# Patient Record
Sex: MALE | NOT HISPANIC OR LATINO | Employment: PART TIME | URBAN - METROPOLITAN AREA
[De-identification: names, ages, dates, MRNs, and addresses within clinical notes are randomized per-mention and may not be internally consistent; named-entity substitution may affect disease eponyms.]

---

## 2018-11-10 ENCOUNTER — APPOINTMENT (OUTPATIENT)
Dept: RADIOLOGY | Facility: MEDICAL CENTER | Age: 43
End: 2018-11-10
Attending: EMERGENCY MEDICINE
Payer: COMMERCIAL

## 2018-11-10 ENCOUNTER — HOSPITAL ENCOUNTER (EMERGENCY)
Facility: MEDICAL CENTER | Age: 43
End: 2018-11-10
Attending: EMERGENCY MEDICINE
Payer: COMMERCIAL

## 2018-11-10 VITALS
HEIGHT: 72 IN | DIASTOLIC BLOOD PRESSURE: 83 MMHG | HEART RATE: 89 BPM | BODY MASS INDEX: 31.35 KG/M2 | WEIGHT: 231.48 LBS | SYSTOLIC BLOOD PRESSURE: 123 MMHG | RESPIRATION RATE: 14 BRPM | OXYGEN SATURATION: 97 % | TEMPERATURE: 97.7 F

## 2018-11-10 DIAGNOSIS — M89.9 LYTIC LESION OF BONE ON X-RAY: ICD-10-CM

## 2018-11-10 LAB
ANION GAP SERPL CALC-SCNC: 10 MMOL/L (ref 0–11.9)
BASOPHILS # BLD AUTO: 0.4 % (ref 0–1.8)
BASOPHILS # BLD: 0.03 K/UL (ref 0–0.12)
BUN SERPL-MCNC: 17 MG/DL (ref 8–22)
CALCIUM SERPL-MCNC: 9.9 MG/DL (ref 8.5–10.5)
CHLORIDE SERPL-SCNC: 105 MMOL/L (ref 96–112)
CO2 SERPL-SCNC: 24 MMOL/L (ref 20–33)
CREAT SERPL-MCNC: 0.77 MG/DL (ref 0.5–1.4)
CRP SERPL HS-MCNC: 0.82 MG/DL (ref 0–0.75)
EOSINOPHIL # BLD AUTO: 0.11 K/UL (ref 0–0.51)
EOSINOPHIL NFR BLD: 1.4 % (ref 0–6.9)
ERYTHROCYTE [DISTWIDTH] IN BLOOD BY AUTOMATED COUNT: 36.1 FL (ref 35.9–50)
ERYTHROCYTE [SEDIMENTATION RATE] IN BLOOD BY WESTERGREN METHOD: 14 MM/HOUR (ref 0–15)
GLUCOSE SERPL-MCNC: 123 MG/DL (ref 65–99)
HCT VFR BLD AUTO: 47.9 % (ref 42–52)
HGB BLD-MCNC: 15.1 G/DL (ref 14–18)
IMM GRANULOCYTES # BLD AUTO: 0.03 K/UL (ref 0–0.11)
IMM GRANULOCYTES NFR BLD AUTO: 0.4 % (ref 0–0.9)
LYMPHOCYTES # BLD AUTO: 1.81 K/UL (ref 1–4.8)
LYMPHOCYTES NFR BLD: 22.9 % (ref 22–41)
MCH RBC QN AUTO: 21.4 PG (ref 27–33)
MCHC RBC AUTO-ENTMCNC: 31.5 G/DL (ref 33.7–35.3)
MCV RBC AUTO: 67.8 FL (ref 81.4–97.8)
MONOCYTES # BLD AUTO: 0.47 K/UL (ref 0–0.85)
MONOCYTES NFR BLD AUTO: 5.9 % (ref 0–13.4)
NEUTROPHILS # BLD AUTO: 5.47 K/UL (ref 1.82–7.42)
NEUTROPHILS NFR BLD: 69 % (ref 44–72)
NRBC # BLD AUTO: 0 K/UL
NRBC BLD-RTO: 0 /100 WBC
PLATELET # BLD AUTO: 291 K/UL (ref 164–446)
PMV BLD AUTO: 10 FL (ref 9–12.9)
POTASSIUM SERPL-SCNC: 4.1 MMOL/L (ref 3.6–5.5)
RBC # BLD AUTO: 7.06 M/UL (ref 4.7–6.1)
SODIUM SERPL-SCNC: 139 MMOL/L (ref 135–145)
WBC # BLD AUTO: 7.9 K/UL (ref 4.8–10.8)

## 2018-11-10 PROCEDURE — 71046 X-RAY EXAM CHEST 2 VIEWS: CPT

## 2018-11-10 PROCEDURE — 80048 BASIC METABOLIC PNL TOTAL CA: CPT

## 2018-11-10 PROCEDURE — 73030 X-RAY EXAM OF SHOULDER: CPT | Mod: LT

## 2018-11-10 PROCEDURE — 86140 C-REACTIVE PROTEIN: CPT

## 2018-11-10 PROCEDURE — 96372 THER/PROPH/DIAG INJ SC/IM: CPT

## 2018-11-10 PROCEDURE — 99284 EMERGENCY DEPT VISIT MOD MDM: CPT

## 2018-11-10 PROCEDURE — 700111 HCHG RX REV CODE 636 W/ 250 OVERRIDE (IP): Performed by: EMERGENCY MEDICINE

## 2018-11-10 PROCEDURE — 73060 X-RAY EXAM OF HUMERUS: CPT | Mod: LT

## 2018-11-10 PROCEDURE — 85652 RBC SED RATE AUTOMATED: CPT

## 2018-11-10 PROCEDURE — 85025 COMPLETE CBC W/AUTO DIFF WBC: CPT

## 2018-11-10 RX ORDER — OXYCODONE HYDROCHLORIDE AND ACETAMINOPHEN 5; 325 MG/1; MG/1
1-2 TABLET ORAL EVERY 4 HOURS PRN
Qty: 18 TAB | Refills: 0 | Status: SHIPPED | OUTPATIENT
Start: 2018-11-10 | End: 2018-11-13

## 2018-11-10 RX ORDER — KETOROLAC TROMETHAMINE 30 MG/ML
30 INJECTION, SOLUTION INTRAMUSCULAR; INTRAVENOUS ONCE
Status: COMPLETED | OUTPATIENT
Start: 2018-11-10 | End: 2018-11-10

## 2018-11-10 RX ADMIN — KETOROLAC TROMETHAMINE 30 MG: 30 INJECTION, SOLUTION INTRAMUSCULAR at 10:39

## 2018-11-10 ASSESSMENT — PAIN SCALES - GENERAL
PAINLEVEL_OUTOF10: 8
PAINLEVEL_OUTOF10: 8

## 2018-11-10 NOTE — ED PROVIDER NOTES
ED Provider Note  CHIEF COMPLAINT  Chief Complaint   Patient presents with   • Arm Pain     L arm, denies trauma   • Arm Swelling     Seen at 10:10 AM    HPI  Abiodun Alvarez is a 43 y.o. male who presents with gradually worsening left shoulder pain and decreased range of motion for the past 2 weeks.  The patient is right-hand dominant.  He denies any trauma.  He is employed as a cook.  He denies any neck pain, fevers, chills, numbness, weakness, night sweats, weight loss, impaired immunity.    REVIEW OF SYSTEMS  See HPI, remainder review of systems negative.  PAST MEDICAL HISTORY   has a past medical history of Diabetes.    SOCIAL HISTORY  Social History     Social History Main Topics   • Smoking status: Never Smoker   • Smokeless tobacco: Not on file   • Alcohol use No   • Drug use: No   • Sexual activity: Not on file       SURGICAL HISTORY  patient denies any surgical history    CURRENT MEDICATIONS  Reviewed.  See Encounter Summary.     ALLERGIES  No Known Allergies    PHYSICAL EXAM  VITAL SIGNS: /83   Pulse 89   Temp 36.5 °C (97.7 °F)   Resp 14   Ht 1.829 m (6')   Wt 105 kg (231 lb 7.7 oz)   SpO2 97%   BMI 31.39 kg/m²   Constitutional: Awake, alert in no apparent distress.  HENT: Normocephalic, Bilateral external ears normal. Nose normal.  Neck is supple, full range of motion.  Eyes: Conjunctiva normal, non-icteric, EOMI.    Thorax & Lungs: Easy unlabored respirations, CTA B  Cardiovascular: Regular rate and rhythm  Abdomen:  No distention  Skin: Visualized skin is  Dry, No erythema, No rash.   Extremities:   Left upper extremity: Excellent  strength, no pain or limitation to the hand, wrist or left elbow.  The patient has no tenderness or swelling over the left shoulder.  There is no tenderness along the clavicle, acromion or scapula.  There is no tenderness over the humeral head.  The patient has significantly decreased range of motion and is only able to range approximately 15-20 degrees in  any direction with regards to left shoulder.  He has pain with all directional movement.    Neurologic: Alert, Grossly non-focal.   Psychiatric: Affect and Mood normal  No cervical, supraclavicular or axillary lymphadenopathy.  RADIOLOGY  DX-HUMERUS 2+ LEFT   Final Result      Left proximal humeral bony lytic lesion with permeative destructive pattern. This again likely represents either metastatic disease, myeloma, or lymphoma.      DX-CHEST-2 VIEWS   Final Result      Linear atelectasis within the left lung base.      DX-SHOULDER 2+ LEFT   Final Result      Ill-defined lytic permeative destructive process seen involving the proximal left proximal humeral diaphysis. Differential diagnosis includes metastatic disease, lymphoma and myeloma.          Nursing notes and vital signs were reviewed. (See chart for details)    12:55 PM -I discussed the treatment results with the patient via translation line.  I also discussed the x-ray with Dr. Blanc of orthopedic surgery.  Dr. Blanc recommends immobilizing the shoulder as it as a high risk of pathologic fracture.      In prescribing controlled substances to this patient, I certify that I have obtained and reviewed the medical history of Abiodun Alvarez. I have also made a good get effort to obtain applicable records from other providers who have treated the patient and records did not demonstrate any increased risk of substance abuse that would prevent me from prescribing controlled substances.     I have conducted a physical exam and documented it. I have reviewed Mr. Alvarez’s prescription history as maintained by the [place] Prescription Monitoring Program.     I have assessed the patient’s risk for abuse, dependency, and addiction using the validated Opioid Risk Tool available at https://www.mdcalc.com/vmdkwd-lffg-ayyf-ort-narcotic-abuse.     Given the above, I believe the benefits of controlled substance therapy outweigh the risks. The reasons for prescribing  controlled substances include patient has metastatic bony destruction which in general is extremely painful.  Accordingly, I have discussed the risk and benefits, treatment plan, and alternative therapies with the patient.         Decision Making:  This is a 43 y.o. year old male who presents with atraumatic pain of the left shoulder.  Differential included septic shoulder, frozen shoulder for possible rotator cuff injury.  Surprisingly, plain films revealed bony destruction concerning for malignancy.  The patient is not hypercalcemic.  He is not anemic, he does not have any leukocytosis.  Clinically he appears well.  He denies any B symptoms.  There is no obvious sign of a malignancy in the lab evaluation.  Chest x-ray is negative for mass.    There is no sign of a septic shoulder, informatory markers are normal, he is afebrile, he has no leukocytosis.    At this point, the pain appears to be related to a underlying malignancy.  Because he is well-appearing this can be worked up on an outpatient basis.  He has a primary care physician follow-up on Monday.  I recommend he take this documentation to the clinic as he will need significant outpatient laboratory workup and possible PET scan.    The patient's blood pressure is elevated today. >120/80. I have referred them to primary care for follow up.       DISPOSITION:  Patient will be discharged home in good condition.    Discharge Medications:  Discharge Medication List as of 11/10/2018  1:43 PM      START taking these medications    Details   oxyCODONE-acetaminophen (PERCOCET) 5-325 MG Tab Take 1-2 Tabs by mouth every four hours as needed for up to 3 days., Disp-18 Tab, R-0, Print Rx Paper, For 3 days             The patient was discharged home (see d/c instructions) and told to return immediately for any signs or symptoms listed, or any worsening at all.  The patient verbally agreed to the discharge precautions and follow-up plan which is documented in  EPIC.          FINAL IMPRESSION  1. Lytic lesion of bone on x-ray

## 2018-11-10 NOTE — ED TRIAGE NOTES
Chief Complaint   Patient presents with   • Arm Pain     L arm, denies trauma   • Arm Swelling      services used. Pt reporting L arm musculoskeletal pain. Denies CP/ SOB. Limited ROM at shoulder.    /83   Pulse 89   Temp 36.5 °C (97.7 °F)   Resp 14   Ht 1.829 m (6')   Wt 105 kg (231 lb 7.7 oz)   SpO2 97%   BMI 31.39 kg/m²     Pt Informed regarding triage process and verbalized understanding to inform triage tech or RN for any changes in condition.  Placed in lobby.

## 2018-11-10 NOTE — ED NOTES
"Chief Complaint   Patient presents with   • Arm Pain     L arm, denies trauma   • Arm Swelling       Pt ambulatory from triage with steady gait. Agree with triage RN note.   Pt guarding L arm.  LUE pain x2 weeks, warm to touch.    Pt reports surgery on L wrist \"many years ago\"  CMS intact.  ERP at bedside, using ipad for .    "

## 2018-11-10 NOTE — ED NOTES
Pt given discharge instructions.   services used for instructions and education.  Pt given sling for arm and instructed that the LUE is non-weight bearing.   Medication education provided. Pt aware not to drive after taking narcotics.  Signed copy in chart along with controlled substance informed consent translated and signed/in chart. Pt aware to follow-up with Ortho asap.  All patient questions answered, reinforced to call with any other questions.  Pt states all belongings in possession. Pt ambulatory off unit.

## 2018-11-14 DIAGNOSIS — M89.9 LYTIC LESION OF BONE ON X-RAY: ICD-10-CM

## 2018-11-15 DIAGNOSIS — M89.9 LYTIC BONE LESIONS ON XRAY: ICD-10-CM

## 2018-11-16 ENCOUNTER — TELEPHONE (OUTPATIENT)
Dept: HEMATOLOGY ONCOLOGY | Facility: MEDICAL CENTER | Age: 43
End: 2018-11-16

## 2018-11-16 NOTE — TELEPHONE ENCOUNTER
Patient has decide to do the PET CT Scan at [facility]. Please call and let [facility] know to fax our office the results [phone]

## 2018-11-16 NOTE — TELEPHONE ENCOUNTER
"Called patient to explain the diet preparation for their scheduled PET/CT. Patient is scheduled on 11/26/2018 with a check in time of 2:15 pm at [address]  The diet preparation starts on 11/25/2018 at 2:30 pm.      Patient is to avoid any sugars or carbohydrates. Also to avoid items like bread, pastry, cereal, potatoes, pasta, rice, soy products, and vegetables, fruits, desserts,cakes, yogurt or cheese. The only beverage the patient may drink is \"Strictly Water\" (No favored water) No milk,soft drinks, tonic or juices, beer, alcoholic beverages or any canned protein shakes. Avoid any processed meats like deli meats, sausage links or cruz. Patient may only have pure protein meats (Chicken,fish,beef or pork) and eggs. Instructed patient not to duque meat in oil and to lightly season the meat. Also avoid any dipping sauces (ranch, ketchup, hot sauce etc.)    The day of the exam the patient is to have nothing to eat or drink (except water) for at least 6 hours before the scan. This includes not eating candy, gum, mints, cough drops, and/or nicorette gum.    Patient must drink 16 oz of water two hours before the scan and then another 16 oz or water one hour before the exam. Patient is aware on the morning of the PET/CT scan they are to drink 16 oz of water at 12:30 pm and another 16 oz of water at 1:30 pm.    If they are diabetic contact [facility]'s PET/CT department at [phone].    Patient may take prescribed mediations with water, If any of their medications must be taken with food, ask their doctor whether they should skip their morning dose, take it later in the day, or take it without food on the day of the scan.     Patient stated \"I actually thought that my husbands appointment is going to be on 11/20/2018 in the morning at 7:10 am. A girl from imaging called and was able to schedule for this time I gave you\" I let patients wife know unfortunately I didn't see any records of that information or conversation. " Patient stated she will call back to our office with updated information of the gal she spoke with earlier today.

## 2018-11-20 ENCOUNTER — TELEPHONE (OUTPATIENT)
Dept: HEMATOLOGY ONCOLOGY | Facility: MEDICAL CENTER | Age: 43
End: 2018-11-20

## 2018-11-20 NOTE — TELEPHONE ENCOUNTER
Called [facility] to obtain PET/CT Results and I spoke with Shahla, she stated patient is actually about to have the PET/CT Scan at the moment and advised for me to call later on this afternoon for results to be faxed over. I agreed and will call later this afternoon.

## 2018-11-21 NOTE — TELEPHONE ENCOUNTER
Patients wife returned Tasha Matute Medical Assistant regarding rescheduled new oncology appointment. Patient was rescheduled for 11/28/2018 at 3:40 pm. Patient's wife verbalized understanding and will call our office if there are any further questions or concerns.

## 2018-11-21 NOTE — TELEPHONE ENCOUNTER
Called patient to inform him that per  he would like to see him sooner for a new patient appointment. Patient has been rescheduled for 11/28/18 1560, left voicemail.

## 2018-11-26 ENCOUNTER — TELEPHONE (OUTPATIENT)
Dept: HEMATOLOGY ONCOLOGY | Facility: MEDICAL CENTER | Age: 43
End: 2018-11-26

## 2018-11-28 ENCOUNTER — OFFICE VISIT (OUTPATIENT)
Dept: HEMATOLOGY ONCOLOGY | Facility: MEDICAL CENTER | Age: 43
End: 2018-11-28
Payer: COMMERCIAL

## 2018-11-28 VITALS
RESPIRATION RATE: 16 BRPM | HEART RATE: 93 BPM | TEMPERATURE: 98.6 F | DIASTOLIC BLOOD PRESSURE: 90 MMHG | HEIGHT: 72 IN | OXYGEN SATURATION: 98 % | BODY MASS INDEX: 31.14 KG/M2 | WEIGHT: 229.94 LBS | SYSTOLIC BLOOD PRESSURE: 122 MMHG

## 2018-11-28 DIAGNOSIS — M89.9 LYTIC BONE LESIONS ON XRAY: ICD-10-CM

## 2018-11-28 PROCEDURE — 99205 OFFICE O/P NEW HI 60 MIN: CPT | Performed by: INTERNAL MEDICINE

## 2018-11-28 RX ORDER — METFORMIN HYDROCHLORIDE 500 MG/1
TABLET, FILM COATED, EXTENDED RELEASE ORAL 2 TIMES DAILY
COMMUNITY

## 2018-11-28 ASSESSMENT — PAIN SCALES - GENERAL: PAINLEVEL: NO PAIN

## 2018-11-29 ENCOUNTER — HOSPITAL ENCOUNTER (OUTPATIENT)
Dept: RADIOLOGY | Facility: MEDICAL CENTER | Age: 43
End: 2018-11-29

## 2018-11-29 ENCOUNTER — TELEPHONE (OUTPATIENT)
Dept: HEMATOLOGY ONCOLOGY | Facility: MEDICAL CENTER | Age: 43
End: 2018-11-29

## 2018-11-29 ENCOUNTER — DOCUMENTATION (OUTPATIENT)
Dept: HEMATOLOGY ONCOLOGY | Facility: MEDICAL CENTER | Age: 43
End: 2018-11-29

## 2018-11-29 DIAGNOSIS — M89.9 LYTIC BONE LESIONS ON XRAY: ICD-10-CM

## 2018-11-29 NOTE — TELEPHONE ENCOUNTER
"Patient is requesting a call back regarding a bone biopsy of the arm which patient was suppose to have done today per wife. Leena stated \"Tasha wanted me to call back regarding if my  was not able to have this biopsy done. Can someone please give me a call.\" I let patients wife know that I will relay message to Tasha Matute, Medical Assistant. Please advise.  "

## 2018-11-29 NOTE — PROGRESS NOTES
Consult Note: Oncology    Date of consultation: 11/28/2018 5:17 PM    Referring provider: Kingsley Wilson P.A.-C.    Reason for Consultation: Painful lytic destructive lesion involving the left proximal humeral diaphysis.   HPI-    11/10/2018 - XRay of Shoulder  - Ill-defined lytic permeative destructive process seen involving the proximal left proximal humeral diaphysis. Differential diagnosis includes metastatic disease, lymphoma and myeloma.  11/10/2018 - XRay of Chest  - There is linear atelectasis within the left lung base.  The heart is normal in size.  There is no evidence of pleural effusion.  Soft tissues and bony structures are unremarkable.  11/10/2018 - XRay of Humerus  - Bone mineralization is normal. There is again seen a permeative lytic destructive lesion involving the left proximal humeral diaphysis which measures 8 cm in length. There is cortical thinning. Soft tissues are normal.  11/20/2018 - PET Scan  - Left proximal humeral cortically destructive bone lesion concerning for an osteosarcoma without evidence of multiple myeloma or metastatic disease. Bone biopsy is recommended.    Mr. Alvarez was referred to oncology after finding an 8 cm destructive lytic lesion in his L proximal humeral diaphysis. He was seen on 11/10/2018 for atraumatic L arm pain. His blood work from the ED was unremarkable.    The patient does not speak English and a phone  was used during this encounter. Patient states that he has had the pain for approximately 22 days but in the last 2 weeks it has progressively gotten worse and he is unable to list his arm or move the shoulder due tot he pain. He denies any trauma and states the pain just started one day. If he is not moving it there is no pain, however, it is a 7/10 when he is moving it. His arm has been in a sling since 11/10/2018 due to the risk of pathological fractures. He is no longer working due to the pain. He denies any night sweats, fevers, chills.  weight loss, decrease in appetite, pain anywhere else in the body, headaches, changes in vision, or changes in bowel and bladder habits.    FH: Father had cancer in his back, does not know specific type.    Past Medical History:    Past Medical History:   Diagnosis Date   • Diabetes        Past surgical history:  No past surgical history on file.    Allergies:  Patient has no known allergies.    Medications:    Current Outpatient Prescriptions   Medication Sig Dispense Refill   • metformin ER modified (GLUMETZA) 500 MG TABLET SR 24 HR Take  by mouth.     • INSULIN 70/30 (70-30) 100 UNIT/ML SUSP Inject 50 Units as instructed every morning. Inject once only every morning      • INSULIN 70/30 (70-30) 100 UNIT/ML SUSP Inject 35 Units as instructed every bedtime. Inject once only before bedtime        No current facility-administered medications for this visit.        Social History:     Social History     Social History   • Marital status:      Spouse name: N/A   • Number of children: N/A   • Years of education: N/A     Occupational History   • Not on file.     Social History Main Topics   • Smoking status: Never Smoker   • Smokeless tobacco: Not on file   • Alcohol use No   • Drug use: No   • Sexual activity: Not on file     Other Topics Concern   • Not on file     Social History Narrative   • No narrative on file       Family History:   No family history on file.    Review of Systems:  All other review of systems are negative except what was mentioned above in the HPI.    Constitutional: No fever, chills, weight loss ,malaise/fatigue.    HEENT: No new auditory or visual complaints. No sore throat and neck pain.     Respiratory:No new cough, sputum production, shortness of breath and wheezing.    Cardiovascular: No new chest pain, palpitations, orthopnea and leg swelling.    Gastrointestinal: No heartburn, nausea, vomiting ,abdominal pain, hematochezia or melena     Genitourinary: Negative for dysuria, hematuria     Musculoskeletal: As above skin: Negative for rash and itching.    Neurological: Negative for focal weakness or headaches.    Endo/Heme/Allergies: No abnormal bleed/bruise.    Psychiatric/Behavioral: No new depression/anxiety.    Physical Exam:  Vitals:   /90 (BP Location: Right arm, Patient Position: Sitting, BP Cuff Size: Adult)   Pulse 93   Temp 37 °C (98.6 °F) (Temporal)   Resp 16   Ht 1.829 m (6')   Wt 104.3 kg (229 lb 15 oz)   SpO2 98%   BMI 31.19 kg/m²     General: Not in acute distress, alert and oriented x 3  HEENT: No pallor, icterus. Oropharynx clear.   Neck: Supple, no palpable masses.  Lymph nodes: No palpable cervical, supraclavicular, axillary or inguinal lymphadenopathy.    CVS: regular rate and rhythm, no rubs or gallops  RESP: Clear to auscultate bilaterally, no wheezing or crackles.   ABD: Soft, non tender, non distended, positive bowel sounds, no palpable organomegaly  EXT: Wearing sling   CNS: Alert and oriented x3, No focal deficits.  Skin- No rash      Labs:   No results for input(s): RBC, HEMOGLOBIN, HEMATOCRIT, PLATELETCT, PROTHROMBTM, APTT, INR, IRON, FERRITIN, TOTIRONBC in the last 72 hours.  Lab Results   Component Value Date/Time    SODIUM 139 11/10/2018 10:32 AM    POTASSIUM 4.1 11/10/2018 10:32 AM    CHLORIDE 105 11/10/2018 10:32 AM    CO2 24 11/10/2018 10:32 AM    GLUCOSE 123 (H) 11/10/2018 10:32 AM    BUN 17 11/10/2018 10:32 AM    CREATININE 0.77 11/10/2018 10:32 AM        Assessment and Plan:    Painful lytic destructive lesion involving the left proximal humeral diaphysis.-   He presents with significant pain related to this.  PET scan done at [facility] raised the possibility of osteosarcoma which will be unusual at this age.  I have referred him to Dr. Mejia , of the oncologist at [facility] however the patient feels like he may not be able to be seen there due to insurance problem.  He does have a tentative appointment for tomorrow.  Hopefully he will be able to be seen  there for biopsy.  His care is complicated by his insurance situation.    Informed him that if not, we will arrange CT-guided biopsy and refer him to Dr. Eduardo.  Discussed various possible differential diagnosis including primary bone sarcoma.  We will check PSA and SPEP to rule out a remote possibility of any other malignancy.  Reinforced with him that is very important to get the biopsy results to decide on treatment options.  If he has osteosarcoma he will be a candidate for neoadjuvant chemotherapy.    I will tentatively see him back in around 2 weeks from now and hopefully we will have the diagnosis does not appear that he will be able to go to [facility] or other program due to his insurance issues.     Severe bone pain-he is currently on opioids.  Depending on diagnosis I will refer him to radiation oncology.      Complex patient requiring complex decision making. Reviewed images/ lab with patient.  Antineoplastic therapy requiring close monitoring.      He agreed and verbalized his agreement and understanding with the current plan.  I answered all questions and concerns he has at this time.              Thank you for allowing me to participate in his care.    Please note that this dictation was created using voice recognition software. I have made every reasonable attempt to correct obvious errors, but I expect that there are errors of grammar and possibly content that I did not discover before finalizing the note.      SIGNATURES:  Ant Mansfield    CC:  Pcp Pt States None  Kingsley Wilson P.A.-C.

## 2018-11-29 NOTE — TELEPHONE ENCOUNTER
I called back Leena and she transferred me to Abiodun. Leena was not on patients chart to release any information and Abiodun gave me permission via phone to release any billing or treatment information.     I explained to Abiodun that his needle bone biopsy is in protocol and that as soon as I know if it was approved by the physician at [facility] I will give him a call back  Abiodun verbalized understanding and had no questions or concerns at this time.

## 2018-11-29 NOTE — PROGRESS NOTES
[facility] requested official diagnosis of cancer. Relayed to them diagnosis is inconclusive as further testing is needed.

## 2018-11-29 NOTE — TELEPHONE ENCOUNTER
Called [facility] and left a message to inform them that the patient is scheduled for a needle bone biopsy on 12/12/18 and we would need to know what they will cover? I also spoke with Juana at [facility] and she stated she will inform billing and verify the cost of the procedure.

## 2018-11-30 NOTE — TELEPHONE ENCOUNTER
Called patient to inform him about his appointment  for his Needle Bone Biopsy scheduled on 12/12/18 check in time 11am for a 1pm appointment. Nothing to eat or drink after midnight. I advised him to call Pre Admit at [phone]. I notified him to report to [address] [place] [address]. I also gave him his follow up appointment to review his results with Dr Mansfield 12/20/18 at 9am. Patient verbalized understanding and I also asked that if he had questions to call us at [phone]

## 2018-11-30 NOTE — TELEPHONE ENCOUNTER
"Rigo from [facility] to [facility] returned phone call regarding patients upcoming procedure, Allie stated \"I'm calling to inform Tasha Medical Assistant regarding patients priced for his upcoming procedure. We're going to have a meeting regarding the process on Monday and I will contact patient regarding prices for procedure and will also call your office.\" I let Allie know I will relay message to Tasha Matute Medical Assistant.  "

## 2018-12-05 ENCOUNTER — PATIENT OUTREACH (OUTPATIENT)
Dept: OTHER | Facility: MEDICAL CENTER | Age: 43
End: 2018-12-05

## 2018-12-05 NOTE — PROGRESS NOTES
Per Dr. Mansfield, proceed with bx prior to seeing ortho.  Ortho clinic reports pt missed his appt.  LVM with Allie Brooks, Care Coordinator at [facility] with update.

## 2018-12-06 ENCOUNTER — TELEPHONE (OUTPATIENT)
Dept: HEMATOLOGY ONCOLOGY | Facility: MEDICAL CENTER | Age: 43
End: 2018-12-06

## 2018-12-06 NOTE — TELEPHONE ENCOUNTER
"Called and spoke with patients wife regarding referral which Dr. Mansfield put in to Orthopedics. Per Dr. Mansfield he wanted me to reach out and make sure they were aware and understood he would be referring patient to an orthopedic. Leena patient's wife stated \"Yes, thank you for calling. We are aware and Abiodun will be having a biopsy on 12/11/2018.\" I let Leena know I will relay this message to Dr. Mansfield. Leena verbalized understanding.  "

## 2018-12-06 NOTE — TELEPHONE ENCOUNTER
New Oncology Patient 48 hour follow up:    Called to welcome patient to [facility] and to follow up on initial consult with Dr. Mansfield on 11/28/2018. Reviewed next steps in the patient’s plan of care. Dr. Mansfield referred patient to Orthopedics which patient is going to have a biopsy on 12/11/2018. Dr. Mansfield also requested a follow up after bone biopsy, patient scheduled to see Dr. Mansfield on 12/20/2018. Patient is to also also follow up with Dr. Mejia. Patient confirmed, answered all patients questions and thanked them for their time. Provided our phone number, [phone], for patient should they have any further questions or concerns.

## 2018-12-07 ENCOUNTER — PATIENT OUTREACH (OUTPATIENT)
Dept: OTHER | Facility: MEDICAL CENTER | Age: 43
End: 2018-12-07

## 2018-12-10 ENCOUNTER — PATIENT OUTREACH (OUTPATIENT)
Dept: OTHER | Facility: MEDICAL CENTER | Age: 43
End: 2018-12-10

## 2018-12-10 NOTE — PROGRESS NOTES
MUMTAZTC w/ CLAUDIA Brooks at [facility] re if scheduled appt w/ Dr. Eduardo/ortho. Left date and info re bx. dsw

## 2018-12-11 ENCOUNTER — TELEPHONE (OUTPATIENT)
Dept: HEMATOLOGY ONCOLOGY | Facility: MEDICAL CENTER | Age: 43
End: 2018-12-11

## 2018-12-18 DIAGNOSIS — C90.00 MULTIPLE MYELOMA NOT HAVING ACHIEVED REMISSION (HCC): ICD-10-CM

## 2018-12-19 ENCOUNTER — PATIENT OUTREACH (OUTPATIENT)
Dept: OTHER | Facility: MEDICAL CENTER | Age: 43
End: 2018-12-19

## 2018-12-19 NOTE — PROGRESS NOTES
LM for Noreen Brooks at [facility] re pt's results back and f/u visit tomorrow.  cheyannew

## 2018-12-20 ENCOUNTER — PATIENT OUTREACH (OUTPATIENT)
Dept: OTHER | Facility: MEDICAL CENTER | Age: 43
End: 2018-12-20

## 2018-12-20 ENCOUNTER — OFFICE VISIT (OUTPATIENT)
Dept: HEMATOLOGY ONCOLOGY | Facility: MEDICAL CENTER | Age: 43
End: 2018-12-20
Payer: COMMERCIAL

## 2018-12-20 VITALS
RESPIRATION RATE: 16 BRPM | OXYGEN SATURATION: 97 % | DIASTOLIC BLOOD PRESSURE: 86 MMHG | WEIGHT: 225.42 LBS | BODY MASS INDEX: 35.38 KG/M2 | HEART RATE: 116 BPM | HEIGHT: 67 IN | TEMPERATURE: 98.6 F | SYSTOLIC BLOOD PRESSURE: 132 MMHG

## 2018-12-20 DIAGNOSIS — C90.30 PLASMACYTOMA (HCC): ICD-10-CM

## 2018-12-20 PROCEDURE — 99214 OFFICE O/P EST MOD 30 MIN: CPT | Performed by: INTERNAL MEDICINE

## 2018-12-20 ASSESSMENT — PAIN SCALES - GENERAL: PAINLEVEL: NO PAIN

## 2018-12-20 NOTE — PROGRESS NOTES
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 Differential diagnosis includes metastatic disease, lymphoma and myeloma.  11/10/2018 - XRay of Chest  - There is linear atelectasis within the left lung base.  The heart is normal in size.  There is no evidence of pleural effusion.  Soft tissues and bony structures are unremarkable.  11/10/2018 - XRay of Humerus  - Bone mineralization is normal. There is again seen a permeative lytic destructive lesion involving the left proximal humeral diaphysis which measures 8 cm in length. There is cortical thinning. Soft tissues are normal.  11/20/2018 - PET Scan  - Left proximal humeral cortically destructive bone lesion concerning for an osteosarcoma without evidence of multiple myeloma or metastatic disease  Interim history  -He had incisional biopsy .    UPEP- Pattern consistent with mixed glomerular and tubular proteinuria  SPEP- Evaluation reveals ONE restricted band(s) migrating in the GAMMA region(s), respectively.   Estimated concentration(s): 0.5 g/dL, respectively. Also noted, ONE faint restricted band(s) migrating in the GAMMA region(s), respectively  Past Medical History:      Past Medical History:   Diagnosis Date   • Diabetes    • Lytic bone lesions on xray 11/29/2018       Past surgical history:       Past Surgical History:   Procedure Laterality Date   • EXCISION MASS/BIOPSY ORTHOPEDICS Left 12/12/2018    Procedure: Incisional biopsy of left humerus;  Surgeon: Niraj Ayon M.D.;  Location: SURGERY [facility];  Service: Orthopedics       Allergies:       Patient has no known allergies.    Medications:         Current Outpatient Prescriptions   Medication Sig Dispense Refill   • HYDROcodone-acetaminophen (NORCO) 5-325 MG Tab per  "tablet Take 1-2 Tabs by mouth every four hours as needed for up to 14 days. 84 Tab 0   • metformin ER modified (GLUMETZA) 500 MG TABLET SR 24 HR Take  by mouth 2 Times a Day.       No current facility-administered medications for this visit.          Social History:     Social History     Social History   • Marital status:      Spouse name: N/A   • Number of children: N/A   • Years of education: N/A     Occupational History   • Not on file.     Social History Main Topics   • Smoking status: Never Smoker   • Smokeless tobacco: Never Used   • Alcohol use No   • Drug use: No   • Sexual activity: Not on file     Other Topics Concern   • Not on file     Social History Narrative   • No narrative on file       Family History:    No family history on file.    Review of Systems:  All other review of systems are negative except what was mentioned above in the HPI.    Constitutional: Negative for fever, chills, weight loss and malaise/fatigue.    HEENT: No new auditory or visual complaints. No sore throat and neck pain.     Respiratory: Negative for cough, sputum production, shortness of breath and wheezing.    Cardiovascular: Negative for chest pain, palpitations, orthopnea and leg swelling.    Gastrointestinal: Negative for heartburn, nausea, vomiting and abdominal pain.    Genitourinary: Negative for dysuria, hematuria    Musculoskeletal: No new arthralgias or myalgias   Skin: Negative for rash and itching.    Neurological: Negative for focal weakness and headaches.    Endo/Heme/Allergies: No abnormal bleed/bruise.    Psychiatric/Behavioral: No new depression/anxiety.    Physical Exam:  Vitals: /86   Pulse (!) 116   Temp 37 °C (98.6 °F)   Resp 16   Ht 1.702 m (5' 7\")   Wt 102.3 kg (225 lb 6.7 oz)   SpO2 97%   BMI 35.31 kg/m²     General: Not in acute distress, alert and oriented x 3  HEENT: No pallor, icterus. Oropharynx clear.   Neck: Supple, no palpable masses.  Lymph nodes: No palpable cervical, " supraclavicular, axillary or inguinal lymphadenopathy.    CVS: regular rate and rhythm, no rubs or gallops  RESP: Clear to auscultate bilaterally, no wheezing or crackles.   ABD: Soft, non tender, non distended, positive bowel sounds, no palpable organomegaly  EXT: No edema or cyanosis  CNS: Alert and oriented x3, No focal deficits.  Skin- No rash      Labs:   No visits with results within 1 Week(s) from this visit.   Latest known visit with results is:   Admission on 12/11/2018, Discharged on 12/12/2018   Component Date Value Ref Range Status   • WBC 12/11/2018 6.2  4.8 - 10.8 K/uL Final   • RBC 12/11/2018 7.40* 4.70 - 6.10 M/uL Final   • Hemoglobin 12/11/2018 15.4  14.0 - 18.0 g/dL Final   • Hematocrit 12/11/2018 49.5  42.0 - 52.0 % Final   • MCV 12/11/2018 66.9* 80.0 - 94.0 fL Final   • MCH 12/11/2018 20.8* 28.7 - 33.1 pg Final   • MCHC 12/11/2018 31.1* 33.0 - 37.0 g/dL Final   • RDW 12/11/2018 17.2* 11.5 - 14.5 % Final   • Platelet Count 12/11/2018 267  130 - 400 K/uL Final   • MPV 12/11/2018 9.7  7.4 - 10.4 fL Final   • Neutrophils Automated 12/11/2018 58.1  39.0 - 70.0 % Final   • Lymphocytes Automated 12/11/2018 26.6  21.0 - 50.0 % Final   • Monocytes Automated 12/11/2018 12.7* 1.7 - 10.0 % Final   • Eosinophils Automated 12/11/2018 2.3  0.0 - 5.0 % Final   • Basophils Automated 12/11/2018 0.3  0.0 - 3.0 % Final   • Abs Neutrophils Automated 12/11/2018 3.6  1.8 - 7.7 K/uL Final   • Abs Lymph Automated 12/11/2018 1.6  1.2 - 4.8 K/uL Final   • Monos (Absolute) 12/11/2018 0.8  0.2 - 0.9 K/uL Final   • Sodium 12/11/2018 138  136 - 145 mmol/L Final   • Potassium 12/11/2018 3.9  3.5 - 5.1 mmol/L Final   • Chloride 12/11/2018 100  98 - 107 mmol/L Final   • Co2 12/11/2018 26  20 - 29 mmol/L Final   • Anion Gap 12/11/2018 12  4 - 12 mmol/L Final   • Glucose 12/11/2018 105* 70 - 100 mg/dL Final   • Bun 12/11/2018 13  9 - 25 mg/dL Final   • Creatinine 12/11/2018 1.0  0.7 - 1.3 mg/dL Final   • Calcium 12/11/2018 9.3  8.5  - 10.1 mg/dL Final   • AST(SGOT) 12/11/2018 38* 10 - 37 U/L Final   • ALT(SGPT) 12/11/2018 58  12 - 78 U/L Final   • Alkaline Phosphatase 12/11/2018 94  46 - 116 U/L Final   • Total Bilirubin 12/11/2018 0.8  0.1 - 1.2 mg/dL Final   • Albumin 12/11/2018 3.8  3.5 - 5.0 g/dL Final   • Total Protein 12/11/2018 8.9* 6.4 - 8.3 g/dL Final   • A-G Ratio 12/11/2018 0.7   Final   • APTT 12/11/2018 33.0  21.8 - 33.8 sec Final    Therapeutic Heparin Range: 63-96 seconds.   • PT 12/11/2018 10.7  9.3 - 12.4 sec Final   • INR 12/11/2018 1.04   Final    Comment: INR is to be used ONLY in monitoring stable orally anticoagulated  patients.  Reference range dependent on prevention therapy:  INR Therapeutic Range  -Prophylaxis of venous thrombosis  -Treatment of venous thrombosis  -Treatment of pulmonary embolism  -Prevention of systemic embolism    <----- 2.0 - 3.0  Tissue heart valve replacement  Acute myocardial infarction  Chronic atrial fibrillation  -Mechanical heart valves  -Antiphospholipid antibodies        <----- 2.5 - 3.5  -Prevention of recurrent AMI  *Reference: aixa Bhagat al. Oral Anticoagulant Mechanism of Action,  Effectiveness, and Optimal Therapetic Range. CHEST 1998:114:445-469.  -------------------------------------------------------------------------     • Sed Rate Westergren 12/11/2018 19* 0 - 15 mm/hour Final   • Stat C-Reactive Protein 12/11/2018 3.3* 0.0 - 0.9 mg/dL Final   • Carcinoembryonic Antigen 12/11/2018 1.0  ng/mL Final    Comment: CEA REFERENCE RANGE  Non-Smokers   Smokers  Male       0.4 - 3.3   0.5 - 6.3  Female     0.2 - 2.5   0.4 - 4.8  Testing methodology: Two-site sandwich immunoassay using direct  Chemiluminometric technology.     • PSA Total 12/11/2018 0.7  <4.1 ng/mL Final    Comment: Reference Range for PSA, Total: Male: < or = to 4.0 ng/mL  Female: Not established     • Free Psa 12/11/2018 0.2  ng/mL Final   • % Free Psa 12/11/2018 SEE BELOW  >25 Final    Comment: % Free PSA was not  calculated since the total PSA is less than  1.0 ng/mL.  Reference Range for PSA, % Free Male: >25 % (calc) Female: Not  established  PSA(ng/mL)          Free PSA%       Estimated(x) Probability  of Cancer (as%)  ------------------------------------------------------------  0 - 2.5                (*)                Approx. 1  2.6 - 4.0 (1)          0 - 27 (2)         24 (3)  4.1 - 10 (4)           0 - 10             56  11 - 15            28  16 - 20            20  21 - 25            16  > or =26           8  >10(+)                 N/A                >50  References:(1)Fernando et al.:Urology 60: 469-474 (2002)  (2)Fernando et al.:J.Urol 168: 922-925 (2002)  Free PSA(%)   Sensitivity(%)  Specificity(%)  < or = 25          85              19  < or = 30          93               9  (3)Catalona et al.:CASSIDY 277: 1491-8360 (1997)  (4)Catalona et al.:CASSIDY 279: 0574-3459 (1998)  (x)These estimates vary with age, ethnicity, family  history and RICH results.  (*)The diagnostic                            usefulness of % Free PSA has not been  established in patients with total PSA below 2.6 ng/mL  (+)In men with PSA above 10 ng/mL, prostate cancer risk is  determined by total PSA alone.  The total PSA value from this assay system is  standardized against the equimolar PSA standard.  The test result will be approximately 20% higher  when compared to the WHO-standardized total  PSA (Siemens assay). Comparison of serial PSA results  be interpreted with this fact in mind.  PSA was performed using the [place]  Immunoassay method. Values obtained from different  assay methods cannot be used interchangeably. PSA  levels, regardless of value, should not be interpreted  as absolute evidence of the presence or absence of  disease.  @ Test Performed By:  [facility]  Jaydon Rahman M.D., Ph.D.,   [address] [place] [id number]  IA #[id number]     • Total Protein  12/11/2018 7.9  6.1 - 8.1 g/dL Final   • Albumin 12/11/2018 4.0  3.8 - 4.8 g/dL Final   • Alpha 1 12/11/2018 0.4* 0.2 - 0.3 g/dL Final   • Alpha 2 12/11/2018 1.0* 0.5 - 0.9 g/dL Final   • Gamma 12/11/2018 1.1  0.8 - 1.7 g/dL Final   • Protein Elect. Interp 12/11/2018 SEE NOTE   Final    Comment: Evaluation reveals ONE restricted band(s) migrating in the GAMMA  region(s), respectively.  Estimated concentration(s): 0.5 g/dL, respectively.  Also noted, ONE faint restricted band(s) migrating in the GAMMA  region(s), respectively. Consider immunofixation analysis if  indicated and not already ordered.  Test(s) performed at:  [facility]  Giselle Montero M.D., Ph.D., [place],   [address] [place] [address]  CLIA #[id number]     • Beta 2 12/11/2018 0.4  0.2 - 0.5 g/dL Final   • Abn Protein Band 1 12/11/2018 0.5* NONE DETECTED g/dL Final   • Total Protein, Urine 12/11/2018 58* 5 - 25 mg/dL Final   • Albumin, urine 12/11/2018 26  % Final   • Alpha 1, urine 12/11/2018 3  % Final   • Alpha 2, urine 12/11/2018 26  % Final   • Beta, urine 12/11/2018 24  % Final   • Gamma, urine 12/11/2018 21  % Final   • Interpretation 12/11/2018 SEE NOTE   Final    Comment: Pattern consistent with mixed glomerular and tubular proteinuria.  Urine protein electrophoresis evaluates the major protein  fractions found in urine and is useful in classifying renal  damage and in detecting monoclonal free light chain.  Test(s) performed at:  [facility]  Giselle Montero M.D., Ph.D., [place],   [address] [place] [id number]  CLIA #[id number]     • GFR If  12/11/2018 >60  >60 mL/min/1.73 m 2 Final   • GFR If Non  12/11/2018 >60  >60 mL/min/1.73 m 2 Final    Comment: The Estimated Glomerular Filtration Rate calculation is derived  from the IDMS-traceable MDRD Study.  This equation has been  validated on   and  Americans 18 years and older.  For additional info go to www.nkdep.nih.gov or www.kdoqi.org     • Mrsa Screen 12/11/2018 Negative   Final    Comment: MRSA target DNA not detected.  This MRSA assay is intended to aid in the prevention and control  of MRSA infections in the healthcare setting.  This assay is not  intended to diagnosis MRSA nor to guide or monitor treatment for  MRSA infections.  Test result may be affected by concurrent  antibiotic therapy.     • Glucose - Accu-Ck 12/12/2018 115* 70 - 100 mg/dL Final   • Glucose - Accu-Ck 12/12/2018 160* 70 - 100 mg/dL Final   • LDH Total 12/12/2018 230  80 - 277 U/L Final   • Free Kappa Light Chains 12/12/2018 70.7* 3.3 - 19.4 mg/L Final   • Free Lambda Light Chains 12/12/2018 18.1  5.7 - 26.3 mg/L Final   • Kappa-Lambda Ratio 12/12/2018 3.89* 0.26 - 1.65 Final    Comment: Free kappa/lambda ratio in serum of normal individuals is  0.26-1.65. Excess production of free kappa or lambda  chains can alter this ratio. Monoclonal free light chains are  found in serum of patients with multiple myeloma, Waldenstrom's  macroglobulinemia, mu-heavy chain disease, primary amyloidosis  light chain deposition disease, monoclonal gammopathy of  undetermined significance, and lymphoproliferative disorders.  Measurement of free light chain concentration in serum is  useful for diagnosis, prognosis, monitoring disease  activity and following response to therapy of these  disorders.  @ Test Performed By:  [employer] 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  SPEP ,UPEP, c/w monoclonal gammopathy  NO other lytic lesions seen in PET scan .  Plan to do serum immunofixation and bone marrow biopsy.  IMWG recommend bone marrow biopsy  and if there is more than 10% bone marrow involvement he will be treated like myeloma while if he has less than 10% bone marrow involvement he will be treated as a solitary plasmacytoma.  I separately discussed this case with Orth oncologist Dr. Mejia who is planning to do ORIF.  He will be candidate for radiation few weeks after that.  Put in a referral for radiation oncology  He will be candidate for bisphosphonate.    Complex patient requiring complex decision making. Reviewed images/ lab with patient.  Antineoplastic therapy requiring close monitoring.    He agreed and verbalized  agreement and understanding with the current plan.  I answered all questions and concerns at this time         Please note that this dictation was created using voice recognition software. I have made every reasonable attempt to correct obvious errors, but I expect that there are errors of grammar and possibly content that I did not discover before finalizing the note.      SIGNATURES:  Ant Mansfield    CC:  Pcp Pt States None  No ref. provider found

## 2018-12-21 PROBLEM — C90.30 PLASMACYTOMA OF BONE (HCC): Status: ACTIVE | Noted: 2018-12-21

## 2018-12-21 NOTE — PROGRESS NOTES
Met with patient and Dr. Mansfield.  English speaking only,  used thru [facility] program.  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  His employer had told him he may return to work when he is medically cleared, which must come from the surgeon Dr. Mejia, who will be repairing his humerus/shoulder on 12/28/ per Dr. Mansfield.. That'll be followed 2-3 weeks later by fractionated radiation.  He has an appointment for Zometa (Zoledronic Acid) in Infusion Services on 1/6/18at 3:30pm.  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  He stated he is paying bills with savings he had as well as help from his family.  He wishes to drop [facility], and be a self pay patient and complete a a financial hardship application.  But in lieu of finding out about his surgery ,I am waiting to hear back from Noreen Brooks,  [facility],  who I  emailed yesterday with the POC, before getting our  involved.   He is undocumented and does not hve private insurance, has been paying for care thru [facility].

## 2018-12-26 ENCOUNTER — TELEPHONE (OUTPATIENT)
Dept: HEMATOLOGY ONCOLOGY | Facility: MEDICAL CENTER | Age: 43
End: 2018-12-26

## 2018-12-26 NOTE — TELEPHONE ENCOUNTER
Patient called and stated that he was told by [facility] that he has a biopsy tomorrow 12/26/18. Patient was upset that nobody from our office called him to inform him of his biopsy appointment. I informed patient that I need to speak to  medical assistant Leena KAUFMAN because I was not able to find any information in patients chart. Leena KAUFMAN MA confirmed that patients bone marrow biopsy is scheduled for 12/27/18 1000 check in for a 1200 appointment. @ [facility] 2nd floor, nothing to eat or drink after midnight,  home, and to call pre admit to [phone].     Patient verbalized understanding and was ok with the appointment he stated that he would just like a call ahead of time. I apologized to the patient and informed him that Leena KAUFMAN MA was just waiting for [facility] to call her back and confirm his appointment.

## 2018-12-27 ENCOUNTER — HOSPITAL ENCOUNTER (OUTPATIENT)
Facility: MEDICAL CENTER | Age: 43
End: 2018-12-27
Attending: INTERNAL MEDICINE | Admitting: INTERNAL MEDICINE
Payer: COMMERCIAL

## 2018-12-27 NOTE — TELEPHONE ENCOUNTER
Patient called and stated that he was not able to have his bone marrow biopsy done today 12/27/18 due to him not having insurance. Patient stated that he is no longer getting help from [facility]. Patient would like to know what he needs to do now and he would like to know if he can get financial help?  Please advise.

## 2018-12-28 NOTE — TELEPHONE ENCOUNTER
I called left message for Noreen at [facility] to confirm if his contact has termed ? If its has then we can refer to Financial Counselor at [facility]

## 2019-01-03 ENCOUNTER — HOSPITAL ENCOUNTER (OUTPATIENT)
Dept: RADIOLOGY | Facility: MEDICAL CENTER | Age: 44
End: 2019-01-03

## 2019-01-03 ENCOUNTER — TELEPHONE (OUTPATIENT)
Dept: HEMATOLOGY ONCOLOGY | Facility: MEDICAL CENTER | Age: 44
End: 2019-01-03

## 2019-01-03 NOTE — TELEPHONE ENCOUNTER
Called patient to get current insurance information.. Patient stated he doesn't have insurance. I explained to him that  A 120.00 prepay would be collected at time of visit. Patient acknowledge understanding by repeating the amount to me and also asking what time his appointment is tomorrow.

## 2019-01-04 ENCOUNTER — APPOINTMENT (OUTPATIENT)
Dept: HEMATOLOGY ONCOLOGY | Facility: MEDICAL CENTER | Age: 44
End: 2019-01-04

## 2019-01-04 ENCOUNTER — TELEPHONE (OUTPATIENT)
Dept: HEMATOLOGY ONCOLOGY | Facility: MEDICAL CENTER | Age: 44
End: 2019-01-04

## 2019-01-04 NOTE — TELEPHONE ENCOUNTER
I called and spoke with the patient wife Regarding the appointment for today 01/04/19, Dr Mansfield would like for him to complete the Bone Marrow

## 2019-01-04 NOTE — TELEPHONE ENCOUNTER
I called left a message for Jenaro (ENDO Scheduling ) to return my call ro get the patient rescheduled for a Bone Marrow Biopsy

## 2019-01-04 NOTE — TELEPHONE ENCOUNTER
"Called and spoke with patients wife regarding bone marrow biopsy, I let Leena know they should be receiving a phone call from scheduling to schedule an appointment for biopsy. Leena stated \"He already had a previous appointment for the biopsy but it was cancelled due to not having insurance.\" I let patients wife Leena know that Leena KAUFMAN, Medical Assistant and Erin our financial assistance for the biopsy co-pay to only be 25$. Leena verbalized understanding and will call our office back if there are any further questions or concerns.   "

## 2019-01-06 ENCOUNTER — OUTPATIENT INFUSION SERVICES (OUTPATIENT)
Dept: ONCOLOGY | Facility: MEDICAL CENTER | Age: 44
End: 2019-01-06
Attending: INTERNAL MEDICINE
Payer: COMMERCIAL

## 2019-01-06 VITALS
WEIGHT: 219.58 LBS | OXYGEN SATURATION: 100 % | TEMPERATURE: 97.7 F | BODY MASS INDEX: 31.44 KG/M2 | RESPIRATION RATE: 18 BRPM | HEIGHT: 70 IN | HEART RATE: 118 BPM | SYSTOLIC BLOOD PRESSURE: 121 MMHG | DIASTOLIC BLOOD PRESSURE: 73 MMHG

## 2019-01-06 DIAGNOSIS — C90.30 PLASMACYTOMA (HCC): ICD-10-CM

## 2019-01-06 LAB
CA-I BLD ISE-SCNC: 1.12 MMOL/L (ref 1.1–1.3)
CREAT BLD-MCNC: 0.8 MG/DL (ref 0.5–1.4)

## 2019-01-06 PROCEDURE — 36415 COLL VENOUS BLD VENIPUNCTURE: CPT

## 2019-01-06 PROCEDURE — 82565 ASSAY OF CREATININE: CPT

## 2019-01-06 PROCEDURE — 82330 ASSAY OF CALCIUM: CPT

## 2019-01-06 PROCEDURE — 700111 HCHG RX REV CODE 636 W/ 250 OVERRIDE (IP): Performed by: INTERNAL MEDICINE

## 2019-01-06 PROCEDURE — 96365 THER/PROPH/DIAG IV INF INIT: CPT

## 2019-01-06 RX ORDER — ZOLEDRONIC ACID 0.04 MG/ML
4 INJECTION, SOLUTION INTRAVENOUS ONCE
Status: COMPLETED | OUTPATIENT
Start: 2019-01-06 | End: 2019-01-06

## 2019-01-06 RX ADMIN — ZOLEDRONIC ACID 4 MG: 0.04 INJECTION, SOLUTION INTRAVENOUS at 16:01

## 2019-01-06 ASSESSMENT — PAIN SCALES - GENERAL: PAINLEVEL_OUTOF10: 0

## 2019-01-06 NOTE — PROGRESS NOTES
Pharmacy Note:    Cr = 0.8, CrCl >125 ml/min  Ionized Ca = 1.12  OK for zoledronic acid (Zometa) 4 mg IV today    AWILDA Nance PharmJohnnaD.

## 2019-01-07 NOTE — PROGRESS NOTES
Pt arrived to [facility], ambulatory, for new Zometa infusion.  line utilized for assessment and education by STEPHANIE Motley. Pt denies any recent or upcoming dental surgeries. 22g PIV established in the R-AC, positive blood return noted. Labs drawn and reviewed, pt within parameters to treat today. Zometa infused with no s/sx of adverse reaction. PIV flushed and removed. Pt left IS with no s/sx of distress. Follow up appointment confirmed.

## 2019-01-09 ENCOUNTER — HOSPITAL ENCOUNTER (OUTPATIENT)
Facility: MEDICAL CENTER | Age: 44
End: 2019-01-09
Attending: INTERNAL MEDICINE | Admitting: INTERNAL MEDICINE
Payer: COMMERCIAL

## 2019-01-09 VITALS
HEIGHT: 71 IN | TEMPERATURE: 98.8 F | BODY MASS INDEX: 30.37 KG/M2 | RESPIRATION RATE: 16 BRPM | DIASTOLIC BLOOD PRESSURE: 72 MMHG | OXYGEN SATURATION: 98 % | WEIGHT: 216.93 LBS | SYSTOLIC BLOOD PRESSURE: 118 MMHG | HEART RATE: 95 BPM

## 2019-01-09 DIAGNOSIS — C90.30 PLASMACYTOMA OF BONE (HCC): ICD-10-CM

## 2019-01-09 LAB
BASOPHILS # BLD AUTO: 0.4 % (ref 0–1.8)
BASOPHILS # BLD: 0.03 K/UL (ref 0–0.12)
COMMENT 1642: NORMAL
DACRYOCYTES BLD QL SMEAR: NORMAL
EOSINOPHIL # BLD AUTO: 0.13 K/UL (ref 0–0.51)
EOSINOPHIL NFR BLD: 1.7 % (ref 0–6.9)
ERYTHROCYTE [DISTWIDTH] IN BLOOD BY AUTOMATED COUNT: 47.1 FL (ref 35.9–50)
GLUCOSE BLD-MCNC: 145 MG/DL (ref 65–99)
HCT VFR BLD AUTO: 31.3 % (ref 42–52)
HGB BLD-MCNC: 9.3 G/DL (ref 14–18)
HGB RETIC QN AUTO: 20.3 PG/CELL (ref 29–35)
HYPOCHROMIA BLD QL SMEAR: ABNORMAL
IMM GRANULOCYTES # BLD AUTO: 0.04 K/UL (ref 0–0.11)
IMM GRANULOCYTES NFR BLD AUTO: 0.5 % (ref 0–0.9)
IMM RETICS NFR: 35.5 % (ref 9.3–17.4)
LYMPHOCYTES # BLD AUTO: 1.13 K/UL (ref 1–4.8)
LYMPHOCYTES NFR BLD: 14.6 % (ref 22–41)
MCH RBC QN AUTO: 21.2 PG (ref 27–33)
MCHC RBC AUTO-ENTMCNC: 29.7 G/DL (ref 33.7–35.3)
MCV RBC AUTO: 71.5 FL (ref 81.4–97.8)
MICROCYTES BLD QL SMEAR: ABNORMAL
MONOCYTES # BLD AUTO: 0.36 K/UL (ref 0–0.85)
MONOCYTES NFR BLD AUTO: 4.7 % (ref 0–13.4)
MORPHOLOGY BLD-IMP: NORMAL
NEUTROPHILS # BLD AUTO: 6.04 K/UL (ref 1.82–7.42)
NEUTROPHILS NFR BLD: 78.1 % (ref 44–72)
NRBC # BLD AUTO: 0.03 K/UL
NRBC BLD-RTO: 0.4 /100 WBC
OVALOCYTES BLD QL SMEAR: NORMAL
PATHOLOGY CONSULT NOTE: NORMAL
PLATELET # BLD AUTO: 518 K/UL (ref 164–446)
PLATELET BLD QL SMEAR: NORMAL
PMV BLD AUTO: 9.1 FL (ref 9–12.9)
POIKILOCYTOSIS BLD QL SMEAR: NORMAL
POLYCHROMASIA BLD QL SMEAR: NORMAL
RBC # BLD AUTO: 4.38 M/UL (ref 4.7–6.1)
RBC BLD AUTO: PRESENT
RETICS # AUTO: 0.24 M/UL (ref 0.04–0.06)
RETICS/RBC NFR: 5.4 % (ref 0.8–2.1)
WBC # BLD AUTO: 7.7 K/UL (ref 4.8–10.8)

## 2019-01-09 PROCEDURE — 88360 TUMOR IMMUNOHISTOCHEM/MANUAL: CPT

## 2019-01-09 PROCEDURE — 99153 MOD SED SAME PHYS/QHP EA: CPT | Performed by: HOSPITALIST

## 2019-01-09 PROCEDURE — 99152 MOD SED SAME PHYS/QHP 5/>YRS: CPT | Performed by: HOSPITALIST

## 2019-01-09 PROCEDURE — 85046 RETICYTE/HGB CONCENTRATE: CPT

## 2019-01-09 PROCEDURE — 82962 GLUCOSE BLOOD TEST: CPT

## 2019-01-09 PROCEDURE — 88300 SURGICAL PATH GROSS: CPT

## 2019-01-09 PROCEDURE — 160002 HCHG RECOVERY MINUTES (STAT): Performed by: HOSPITALIST

## 2019-01-09 PROCEDURE — 85025 COMPLETE CBC W/AUTO DIFF WBC: CPT

## 2019-01-09 PROCEDURE — 38222 DX BONE MARROW BX & ASPIR: CPT | Performed by: HOSPITALIST

## 2019-01-09 PROCEDURE — 700111 HCHG RX REV CODE 636 W/ 250 OVERRIDE (IP)

## 2019-01-09 PROCEDURE — 160038 HCHG SURGERY MINUTES - EA ADDL 1 MIN LEVEL 2: Performed by: HOSPITALIST

## 2019-01-09 PROCEDURE — 700105 HCHG RX REV CODE 258: Performed by: HOSPITALIST

## 2019-01-09 PROCEDURE — 160035 HCHG PACU - 1ST 60 MINS PHASE I: Performed by: HOSPITALIST

## 2019-01-09 PROCEDURE — 160048 HCHG OR STATISTICAL LEVEL 1-5: Performed by: HOSPITALIST

## 2019-01-09 PROCEDURE — 88311 DECALCIFY TISSUE: CPT

## 2019-01-09 PROCEDURE — 88305 TISSUE EXAM BY PATHOLOGIST: CPT | Mod: 59

## 2019-01-09 PROCEDURE — 160027 HCHG SURGERY MINUTES - 1ST 30 MINS LEVEL 2: Performed by: HOSPITALIST

## 2019-01-09 PROCEDURE — 88313 SPECIAL STAINS GROUP 2: CPT | Mod: 91

## 2019-01-09 RX ORDER — MIDAZOLAM HYDROCHLORIDE 1 MG/ML
INJECTION INTRAMUSCULAR; INTRAVENOUS
Status: DISCONTINUED | OUTPATIENT
Start: 2019-01-09 | End: 2019-01-09 | Stop reason: HOSPADM

## 2019-01-09 RX ORDER — SODIUM CHLORIDE 9 MG/ML
500 INJECTION, SOLUTION INTRAVENOUS
Status: DISCONTINUED | OUTPATIENT
Start: 2019-01-09 | End: 2019-01-09 | Stop reason: HOSPADM

## 2019-01-09 RX ORDER — MIDAZOLAM HYDROCHLORIDE 1 MG/ML
.5-2 INJECTION INTRAMUSCULAR; INTRAVENOUS PRN
Status: DISCONTINUED | OUTPATIENT
Start: 2019-01-09 | End: 2019-01-09 | Stop reason: HOSPADM

## 2019-01-09 ASSESSMENT — PAIN SCALES - GENERAL
PAINLEVEL_OUTOF10: 0
PAINLEVEL_OUTOF10: 0

## 2019-01-09 NOTE — OR NURSING
1227  RECEIVED PATIENT FROM ENDO.  REPORT FROM RN.  RESPIRATIONS ARE EVEN AND UNLABORED.  PATIENT DENIES PAIN.  DRESSING TO LEFT HIP WITH ERASER SIZE SPOT OF OLD BLOODY DRAINAGE NOTE.  SLING AND DRESSING TO LEFT ARM FROM PRIOR SURGERY.      1232  WIFE VEL TO BEDSIDE.  PATIENT TAKING PO.      1247  DISCHARGED.  DISCHARGE INSTRUCTIONS GIVEN TO PATIENT AND HIS WIFE.  LISA TRANSLATED.  A VERBAL UNDERSTANDING OF ALL INSTRUCTIONS WAS STATED.  PATIENT TAKING PO AND AMBULATING WITHOUT DIFFICULTY.  PATIENT STATES HE IS READY TO GO HOME.  LEFT HIP DRESSING REMAINS UNCHANGED.

## 2019-01-09 NOTE — PROCEDURES
Bone Marrow Biopsy/Aspiration  Date/Time: 1/9/2019 11:29 AM  Performed by: LINDSAY SZYMANSKI  Authorized by: LINDSAY SZYMANSKI     Consent:     Consent obtained:  Written    Consent given by:  Patient    Risks discussed:  Bleeding, infection and pain    Alternatives discussed:  No treatment  Universal protocol:     Procedure explained and questions answered to patient or proxy's satisfaction: yes      Relevant documents present and verified: yes      Test results available and properly labeled: yes      Imaging studies available: yes      Required blood products, implants, devices, and special equipment available: yes      Immediately prior to procedure a time out was called: yes      Site/side marked: yes      Patient identity confirmed:  Verbally with patient  Pre-procedure details:     Procedure type:  Aspiration and biopsy    Requesting physician:  Shyla    Indications:  Plasmacytoma    Position:  Prone    Buttock laterality:  Left    Local anesthetic:  1% Lidocaine    Subcutaneous volume:  1 mL    Periosteum anesthetic volume:  3 mL    Preparation: Patient was prepped and draped in usual sterile fashion    Sedation:     Patient Sedated: Yes      Sedation type: moderate (conscious) sedation      Sedation:  Midazolam    Analgesia:  Fentanyl    Sedation length:  30 minutes (3mg versed, 75mcg fentanyl; 1123-->1153)  Procedure details:     Aspirate obtained:  5 mL followed by 5 mL    Biopsy performed:  1 core    Number of attempts:  2    Estimated blood loss (mL):  3  Post-procedure:     Puncture site:  Adhesive bandage applied and direct pressure applied    Patient tolerance of procedure:  Tolerated well, no immediate complications

## 2019-01-11 ENCOUNTER — PATIENT OUTREACH (OUTPATIENT)
Dept: OTHER | Facility: MEDICAL CENTER | Age: 44
End: 2019-01-11

## 2019-01-11 ENCOUNTER — HOSPITAL ENCOUNTER (OUTPATIENT)
Dept: RADIATION ONCOLOGY | Facility: MEDICAL CENTER | Age: 44
End: 2019-01-31
Attending: RADIOLOGY
Payer: COMMERCIAL

## 2019-01-11 VITALS
BODY MASS INDEX: 30.66 KG/M2 | HEIGHT: 71 IN | DIASTOLIC BLOOD PRESSURE: 77 MMHG | WEIGHT: 219 LBS | TEMPERATURE: 96.9 F | SYSTOLIC BLOOD PRESSURE: 123 MMHG | HEART RATE: 102 BPM | OXYGEN SATURATION: 99 %

## 2019-01-11 DIAGNOSIS — C90.30 PLASMACYTOMA OF BONE (HCC): ICD-10-CM

## 2019-01-11 PROCEDURE — 99205 OFFICE O/P NEW HI 60 MIN: CPT | Performed by: RADIOLOGY

## 2019-01-11 PROCEDURE — 99214 OFFICE O/P EST MOD 30 MIN: CPT | Performed by: RADIOLOGY

## 2019-01-11 ASSESSMENT — PAIN SCALES - GENERAL: PAINLEVEL: 2=MINIMAL-SLIGHT

## 2019-01-11 NOTE — NON-PROVIDER
"Patient was seen today in clinic with Dr. Michael Gutiérrez for Plasmacytoma left humerus.  Vitals signs and weight were obtained and pain assessment was completed.  Allergies and medications were reviewed with the patient.  Review of systems completed.     Vitals/Pain:  Vitals:    01/11/19 1026   BP: 123/77   Pulse: (!) 102   Temp: 36.1 °C (96.9 °F)   SpO2: 99%   Weight: 99.3 kg (219 lb)   Height: 1.803 m (5' 11\")   Pain Score: 2=Minimal-Slight (left upper arm)        Allergies:   Patient has no known allergies.    Current Medications:  Current Outpatient Prescriptions   Medication Sig Dispense Refill   • enoxaparin (LOVENOX) 30 MG/0.3ML Solution inj Inject 0.3 mL as instructed every 12 hours. 56 Syringe 0   • docusate sodium 100 MG Cap Take 100 mg by mouth 2 Times a Day. 60 Cap 0   • ferrous sulfate 325 (65 Fe) MG EC tablet Take 1 Tab by mouth 3 times a day, with meals. 90 Tab 1   • oxyCODONE-acetaminophen (PERCOCET) 7.5-325 MG per tablet Take 1-2 Tabs by mouth every four hours as needed (pain) for up to 14 days. 84 Tab 0   • metformin ER modified (GLUMETZA) 500 MG TABLET SR 24 HR Take  by mouth 2 Times a Day.       No current facility-administered medications for this encounter.          PCP:  Dorian Stover R.N.  "

## 2019-01-11 NOTE — CONSULTS
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 Antonio Carbajal today in consultation at the request of Dr. Mansfield for solitary plasmacytoma.  Patient originally presented with painful left shoulder.  Patient had x-ray of the shoulder on November 10, 2018 which showed an ill-defined lytic permeated destructive lesion involving the proximal left humeral diaphysis.  Patient had a PET/CT scan on November 20, 2018 which showed a left proximal humeral cortical destructive bone lesion concerning for an osteosarcoma without evidence of multiple myeloma or metastatic disease.  Patient saw Dr. Mansfield who recommended bone biopsy which showed solitary plasmacytoma.  Patient had ORIF by Dr. Kraig Ayon on 12/28/18. Patient had bone marrow 1/9/18 which is normal. Patient is postop and doing well with 2/10 pain focused in his left arm. He is taking minimal pain medications at this time.    PAST MEDICAL HISTORY:   Past Medical History:   Diagnosis Date   • Diabetes    • Lytic bone lesions on xray 11/29/2018   • Plasmacytoma of bone (HCC) 12/28/2018    left humerus s/p ORIF       PAST SURGICAL HISTORY:  Past Surgical History:   Procedure Laterality Date   • BONE MARROW BIOPSY, NDL/TROCAR Left 1/9/2019    Procedure: BONE MARROW BIOPSY, NDL/TROCAR;  Surgeon: Ambreen Whelan M.D.;  Location: [facility];  Service: Orthopedics   • BONE MARROW ASPIRATION Left 1/9/2019    Procedure: BONE MARROW ASPIRATION    /    Shyla;  Surgeon: Ambreen Whelan M.D.;  Location: [facility];  Service: Orthopedics   • HUMERUS NAILING INTRAMEDULLARY Left 12/28/2018    Procedure: ORIF LEFT HUMERUS;  Surgeon: Niraj Ayon M.D.;  Location: SURGERY [facility];  Service: Orthopedics   • EXCISION MASS/BIOPSY ORTHOPEDICS Left 12/12/2018    Procedure:  "Incisional biopsy of left humerus;  Surgeon: Niraj Ayon M.D.;  Location: SURGERY [facility];  Service: Orthopedics       CURRENT MEDICATIONS:  Current Outpatient Prescriptions   Medication Sig Dispense Refill   • enoxaparin (LOVENOX) 30 MG/0.3ML Solution inj Inject 0.3 mL as instructed every 12 hours. 56 Syringe 0   • docusate sodium 100 MG Cap Take 100 mg by mouth 2 Times a Day. 60 Cap 0   • ferrous sulfate 325 (65 Fe) MG EC tablet Take 1 Tab by mouth 3 times a day, with meals. 90 Tab 1   • oxyCODONE-acetaminophen (PERCOCET) 7.5-325 MG per tablet Take 1-2 Tabs by mouth every four hours as needed (pain) for up to 14 days. 84 Tab 0   • metformin ER modified (GLUMETZA) 500 MG TABLET SR 24 HR Take  by mouth 2 Times a Day.       No current facility-administered medications for this encounter.        ALLERGIES:    Patient has no known allergies.    FAMILY HISTORY:    Family History   Problem Relation Age of Onset   • Cancer Father         unknown       SOCIAL HISTORY:    Social History   Substance Use Topics   • Smoking status: Never Smoker   • Smokeless tobacco: Never Used   • Alcohol use No     Patient is , lives in [place] and has 3 children. Patient is currently unemployed but has worked as a .    REVIEW OF SYSTEMS:  A greater than 10 point review of systems was completed in patient's chart on 1/11/2019 and scanned in to [facility].    The rest of the review of systems is negative and has been reviewed by me.  All are negative with relationship to this diagnosis with the exception of: Positive for weight loss, fatigue, fever, diabetes          PHYSICAL EXAM:    Vitals:    01/11/19 1026   BP: 123/77   Pulse: (!) 102   Temp: 36.1 °C (96.9 °F)   SpO2: 99%   Weight: 99.3 kg (219 lb)   Height: 1.803 m (5' 11\")   Pain Score: 2=Minimal-Slight (left upper arm)      1= Restricted in physically strenuous activity, but ambulatory and able to carry out work of a light sedentary nature, e.g., light housework, office " work.    Pain Scale: 0-10  Pain Assessement: 2  Pain Location, Orientation and Scale: left upper arm  What makes the pain better: oxycodone 1-2 x day  What makes the pain worse: movement    GENERAL: No apparent distress.  HEENT:  Pupils are equal, round, and reactive to light.  Extraocular muscles   are intact. Sclerae nonicteric.  Conjunctivae pink.  Oral cavity, tongue   protrudes midline.   NECK:  Supple without evidence of thyromegaly.  NODES:  No peripheral adenopathy of the neck, supraclavicular fossa bilaterally.  LUNGS:  Good effort  HEART:  Regular rate  EXTREMITIES:  Without Edema.  NEUROLOGIC:  Cranial nerves II through XII were intact. Normal stance and gait motor and sensory grossly within normal limits  MSK: left humerus scar well healing c/d/i      [id number]  Status post fixation left humerus as described above.    MR humerus 18  1.  As seen on plain films there is a very large destructive aggressive mass lesion involving the humeral head extending into the mid humeral shaft with a mildly dilated pathologic fracture. Differential diagnostic possibilities are as described on plain   films. There is significant cortical destruction and extraosseous soft tissue mass primarily proximally but also extending to involve the anterior humeral shaft more distally. This extraosseous enhancing soft tissue mass approaches the neurovascular   bundle proximally within the upper arm but does not appear to show any encasement or involvement of the neurovascular bundle.  2.  There is mild edema within the surrounding musculature probably most notably the deltoid insertion on the humerus.    LABS:  Lab Results   Component Value Date/Time    WBC 7.7 2019 10:42 AM    RBC 4.38 (L) 2019 10:42 AM    HEMOGLOBIN 9.3 (L) 2019 10:42 AM    HEMATOCRIT 31.3 (L) 2019 10:42 AM    MCV 71.5 (L) 2019 10:42 AM    MCH 21.2 (L) 2019 10:42 AM    MCHC 29.7 (L) 2019 10:42 AM    MPV 9.1  01/09/2019 10:42 AM    NEUTSPOLYS 78.10 (H) 01/09/2019 10:42 AM    LYMPHOCYTES 14.60 (L) 01/09/2019 10:42 AM    MONOCYTES 4.70 01/09/2019 10:42 AM    EOSINOPHILS 1.70 01/09/2019 10:42 AM    BASOPHILS 0.40 01/09/2019 10:42 AM    HYPOCHROMIA 1+ 01/09/2019 10:42 AM      Lab Results   Component Value Date/Time    SODIUM 138 12/11/2018 05:15 PM    POTASSIUM 3.9 12/11/2018 05:15 PM    CHLORIDE 100 12/11/2018 05:15 PM    CO2 26 12/11/2018 05:15 PM    GLUCOSE 105 (H) 12/11/2018 05:15 PM    BUN 13 12/11/2018 05:15 PM    CREATININE 1.0 12/11/2018 05:15 PM        PATHOLOGY:  12/12/18      IMPRESSION:   43 year old with 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  Patient has some confusion about his surgery and thought that his cancer was completely removed however I explained to him that he simply had a fixation of his humerus prevent future fracture.  I explained to him that his bone marrow was negative meaning that he does not have multiple myeloma but rather just a solitary plasmacytoma.  I did explain that solitary plasmacytomas have a high likelihood of progressing to multiple myeloma upwards of 50% at 10 years.  However at this point the treatment for this is curative intent radiation.  I explained that this is given over 5 weeks with 45-50 Gy in 25-28 fractions.  I explained that the acute side effects can include some fatigue, dermatitis, moist desquamation.  Late effects can include skin fibrosis or arthritis.     The patient would like to proceed forward with treatment and we will set up CT simulation for treatment planning imaging in the next few days.  That will consist of supine immobilization, targeting requirements, appropriate skin surface marking for radiation treatment.     We will then complete the behind the scenes planning process over several days using appropriate image fusion, target  delineation, dosimetry,  checks, and treatment scheduling.      We discussed the risks, benefits and side effects of treatment and the patient is amenable to treatment.  If patient has any questions or concerns, he should feel free to contact me.    Thank you for the opportunity to participate in his care.  If any questions or comments, please do not hesitate in calling.    CC: Dr. Mansfield, Dr. Ayon

## 2019-01-12 NOTE — PROGRESS NOTES
Nurse Ricky met with patient and his wife today with a .  The patient reports that he is paying his bills with his savings account of $4000.  He also states that he has family helping out with food for his family which includes 3 children.  He is unable to work but does have $500 dollars coming in a month from welfare.  He currently is unable to work as a cook.  He does and has been in contact with Chandra ross .  Nurse Ricky gave him several resources and a [place] [facility] Application.  Navigation will follow.

## 2019-01-24 ENCOUNTER — HOSPITAL ENCOUNTER (OUTPATIENT)
Dept: RADIATION ONCOLOGY | Facility: MEDICAL CENTER | Age: 44
End: 2019-01-24

## 2019-01-24 PROCEDURE — 77290 THER RAD SIMULAJ FIELD CPLX: CPT | Performed by: RADIOLOGY

## 2019-01-24 PROCEDURE — 77334 RADIATION TREATMENT AID(S): CPT | Performed by: RADIOLOGY

## 2019-01-24 PROCEDURE — 77334 RADIATION TREATMENT AID(S): CPT | Mod: 26 | Performed by: RADIOLOGY

## 2019-01-24 PROCEDURE — 77290 THER RAD SIMULAJ FIELD CPLX: CPT | Mod: 26 | Performed by: RADIOLOGY

## 2019-01-24 PROCEDURE — 77263 THER RADIOLOGY TX PLNG CPLX: CPT | Performed by: RADIOLOGY

## 2019-02-01 ENCOUNTER — HOSPITAL ENCOUNTER (OUTPATIENT)
Dept: RADIATION ONCOLOGY | Facility: MEDICAL CENTER | Age: 44
End: 2019-02-28
Attending: RADIOLOGY
Payer: COMMERCIAL

## 2019-02-01 PROCEDURE — 77295 3-D RADIOTHERAPY PLAN: CPT | Mod: 26 | Performed by: RADIOLOGY

## 2019-02-01 PROCEDURE — 77300 RADIATION THERAPY DOSE PLAN: CPT | Mod: 26 | Performed by: RADIOLOGY

## 2019-02-01 PROCEDURE — 77334 RADIATION TREATMENT AID(S): CPT | Mod: 26 | Performed by: RADIOLOGY

## 2019-02-01 PROCEDURE — 77295 3-D RADIOTHERAPY PLAN: CPT | Performed by: RADIOLOGY

## 2019-02-01 PROCEDURE — 77300 RADIATION THERAPY DOSE PLAN: CPT | Performed by: RADIOLOGY

## 2019-02-01 PROCEDURE — 77334 RADIATION TREATMENT AID(S): CPT | Performed by: RADIOLOGY

## 2019-02-03 ENCOUNTER — APPOINTMENT (OUTPATIENT)
Dept: ONCOLOGY | Facility: MEDICAL CENTER | Age: 44
End: 2019-02-03
Attending: INTERNAL MEDICINE
Payer: MEDICAID

## 2019-02-04 ENCOUNTER — HOSPITAL ENCOUNTER (OUTPATIENT)
Dept: RADIATION ONCOLOGY | Facility: MEDICAL CENTER | Age: 44
End: 2019-02-04

## 2019-02-04 PROCEDURE — 77280 THER RAD SIMULAJ FIELD SMPL: CPT | Performed by: RADIOLOGY

## 2019-02-04 PROCEDURE — 77280 THER RAD SIMULAJ FIELD SMPL: CPT | Mod: 26 | Performed by: RADIOLOGY

## 2019-02-04 PROCEDURE — 77412 RADIATION TX DELIVERY LVL 3: CPT | Performed by: RADIOLOGY

## 2019-02-05 ENCOUNTER — HOSPITAL ENCOUNTER (OUTPATIENT)
Dept: RADIATION ONCOLOGY | Facility: MEDICAL CENTER | Age: 44
End: 2019-02-05

## 2019-02-05 PROCEDURE — 77417 THER RADIOLOGY PORT IMAGE(S): CPT | Performed by: RADIOLOGY

## 2019-02-05 PROCEDURE — 77412 RADIATION TX DELIVERY LVL 3: CPT | Performed by: RADIOLOGY

## 2019-02-06 PROCEDURE — 77336 RADIATION PHYSICS CONSULT: CPT | Performed by: RADIOLOGY

## 2019-02-06 PROCEDURE — 77417 THER RADIOLOGY PORT IMAGE(S): CPT | Performed by: RADIOLOGY

## 2019-02-06 PROCEDURE — 77412 RADIATION TX DELIVERY LVL 3: CPT | Performed by: RADIOLOGY

## 2019-02-07 ENCOUNTER — HOSPITAL ENCOUNTER (OUTPATIENT)
Dept: RADIATION ONCOLOGY | Facility: MEDICAL CENTER | Age: 44
End: 2019-02-07

## 2019-02-07 PROCEDURE — 77412 RADIATION TX DELIVERY LVL 3: CPT | Performed by: RADIOLOGY

## 2019-02-07 PROCEDURE — 77417 THER RADIOLOGY PORT IMAGE(S): CPT | Performed by: RADIOLOGY

## 2019-02-08 ENCOUNTER — HOSPITAL ENCOUNTER (OUTPATIENT)
Dept: RADIATION ONCOLOGY | Facility: MEDICAL CENTER | Age: 44
End: 2019-02-08

## 2019-02-08 PROCEDURE — 77417 THER RADIOLOGY PORT IMAGE(S): CPT | Performed by: RADIOLOGY

## 2019-02-08 PROCEDURE — 77412 RADIATION TX DELIVERY LVL 3: CPT | Performed by: RADIOLOGY

## 2019-02-08 PROCEDURE — 77427 RADIATION TX MANAGEMENT X5: CPT | Performed by: RADIOLOGY

## 2019-02-08 NOTE — ADDENDUM NOTE
Encounter addended by: Sendy Christie R.N. on: 2/8/2019  1:27 PM<BR>    Actions taken: Visit Navigator Flowsheet section accepted

## 2019-02-11 ENCOUNTER — HOSPITAL ENCOUNTER (OUTPATIENT)
Dept: RADIATION ONCOLOGY | Facility: MEDICAL CENTER | Age: 44
End: 2019-02-11

## 2019-02-11 PROCEDURE — 77412 RADIATION TX DELIVERY LVL 3: CPT | Performed by: RADIOLOGY

## 2019-02-11 PROCEDURE — 77417 THER RADIOLOGY PORT IMAGE(S): CPT | Performed by: RADIOLOGY

## 2019-02-12 ENCOUNTER — HOSPITAL ENCOUNTER (OUTPATIENT)
Dept: RADIATION ONCOLOGY | Facility: MEDICAL CENTER | Age: 44
End: 2019-02-12

## 2019-02-12 PROCEDURE — 77417 THER RADIOLOGY PORT IMAGE(S): CPT | Performed by: RADIOLOGY

## 2019-02-12 PROCEDURE — 77412 RADIATION TX DELIVERY LVL 3: CPT | Performed by: RADIOLOGY

## 2019-02-13 PROCEDURE — 77336 RADIATION PHYSICS CONSULT: CPT | Performed by: RADIOLOGY

## 2019-02-13 PROCEDURE — 77417 THER RADIOLOGY PORT IMAGE(S): CPT | Performed by: RADIOLOGY

## 2019-02-13 PROCEDURE — 77412 RADIATION TX DELIVERY LVL 3: CPT | Performed by: RADIOLOGY

## 2019-02-14 ENCOUNTER — HOSPITAL ENCOUNTER (OUTPATIENT)
Dept: RADIATION ONCOLOGY | Facility: MEDICAL CENTER | Age: 44
End: 2019-02-14

## 2019-02-14 PROCEDURE — 77417 THER RADIOLOGY PORT IMAGE(S): CPT | Performed by: RADIOLOGY

## 2019-02-14 PROCEDURE — 77412 RADIATION TX DELIVERY LVL 3: CPT | Performed by: RADIOLOGY

## 2019-02-15 ENCOUNTER — HOSPITAL ENCOUNTER (OUTPATIENT)
Dept: RADIATION ONCOLOGY | Facility: MEDICAL CENTER | Age: 44
End: 2019-02-15

## 2019-02-15 PROCEDURE — 77417 THER RADIOLOGY PORT IMAGE(S): CPT | Performed by: RADIOLOGY

## 2019-02-15 PROCEDURE — 77412 RADIATION TX DELIVERY LVL 3: CPT | Performed by: RADIOLOGY

## 2019-02-15 PROCEDURE — 77427 RADIATION TX MANAGEMENT X5: CPT | Performed by: RADIOLOGY

## 2019-02-19 ENCOUNTER — HOSPITAL ENCOUNTER (OUTPATIENT)
Dept: RADIATION ONCOLOGY | Facility: MEDICAL CENTER | Age: 44
End: 2019-02-19

## 2019-02-19 PROCEDURE — 77417 THER RADIOLOGY PORT IMAGE(S): CPT | Performed by: RADIOLOGY

## 2019-02-19 PROCEDURE — 77412 RADIATION TX DELIVERY LVL 3: CPT | Performed by: RADIOLOGY

## 2019-02-20 PROCEDURE — 77412 RADIATION TX DELIVERY LVL 3: CPT | Performed by: RADIOLOGY

## 2019-02-20 PROCEDURE — 77417 THER RADIOLOGY PORT IMAGE(S): CPT | Performed by: RADIOLOGY

## 2019-02-21 ENCOUNTER — HOSPITAL ENCOUNTER (OUTPATIENT)
Dept: RADIATION ONCOLOGY | Facility: MEDICAL CENTER | Age: 44
End: 2019-02-21

## 2019-02-21 PROCEDURE — 77412 RADIATION TX DELIVERY LVL 3: CPT | Performed by: RADIOLOGY

## 2019-02-21 PROCEDURE — 77336 RADIATION PHYSICS CONSULT: CPT | Performed by: RADIOLOGY

## 2019-02-21 PROCEDURE — 77417 THER RADIOLOGY PORT IMAGE(S): CPT | Performed by: RADIOLOGY

## 2019-02-22 ENCOUNTER — HOSPITAL ENCOUNTER (OUTPATIENT)
Dept: RADIATION ONCOLOGY | Facility: MEDICAL CENTER | Age: 44
End: 2019-02-22

## 2019-02-22 PROCEDURE — 77412 RADIATION TX DELIVERY LVL 3: CPT | Performed by: RADIOLOGY

## 2019-02-22 PROCEDURE — 77417 THER RADIOLOGY PORT IMAGE(S): CPT | Performed by: RADIOLOGY

## 2019-02-25 ENCOUNTER — HOSPITAL ENCOUNTER (OUTPATIENT)
Dept: RADIATION ONCOLOGY | Facility: MEDICAL CENTER | Age: 44
End: 2019-02-25

## 2019-02-25 PROCEDURE — 77412 RADIATION TX DELIVERY LVL 3: CPT | Performed by: RADIOLOGY

## 2019-02-25 PROCEDURE — 77417 THER RADIOLOGY PORT IMAGE(S): CPT | Performed by: RADIOLOGY

## 2019-02-25 PROCEDURE — 77427 RADIATION TX MANAGEMENT X5: CPT | Performed by: RADIOLOGY

## 2019-02-26 ENCOUNTER — HOSPITAL ENCOUNTER (OUTPATIENT)
Dept: RADIATION ONCOLOGY | Facility: MEDICAL CENTER | Age: 44
End: 2019-02-26

## 2019-02-26 PROCEDURE — 77417 THER RADIOLOGY PORT IMAGE(S): CPT | Performed by: RADIOLOGY

## 2019-02-26 PROCEDURE — 77412 RADIATION TX DELIVERY LVL 3: CPT | Performed by: RADIOLOGY

## 2019-02-27 ENCOUNTER — HOSPITAL ENCOUNTER (OUTPATIENT)
Dept: RADIATION ONCOLOGY | Facility: MEDICAL CENTER | Age: 44
End: 2019-02-27

## 2019-02-27 PROCEDURE — 77412 RADIATION TX DELIVERY LVL 3: CPT | Performed by: RADIOLOGY

## 2019-02-27 PROCEDURE — 77417 THER RADIOLOGY PORT IMAGE(S): CPT | Performed by: RADIOLOGY

## 2019-02-28 ENCOUNTER — HOSPITAL ENCOUNTER (OUTPATIENT)
Dept: RADIATION ONCOLOGY | Facility: MEDICAL CENTER | Age: 44
End: 2019-02-28

## 2019-02-28 PROCEDURE — 77307 TELETHX ISODOSE PLAN CPLX: CPT | Performed by: RADIOLOGY

## 2019-02-28 PROCEDURE — 77334 RADIATION TREATMENT AID(S): CPT | Mod: 26 | Performed by: RADIOLOGY

## 2019-02-28 PROCEDURE — 77336 RADIATION PHYSICS CONSULT: CPT | Performed by: RADIOLOGY

## 2019-02-28 PROCEDURE — 77417 THER RADIOLOGY PORT IMAGE(S): CPT | Performed by: RADIOLOGY

## 2019-02-28 PROCEDURE — 77334 RADIATION TREATMENT AID(S): CPT | Performed by: RADIOLOGY

## 2019-02-28 PROCEDURE — 77412 RADIATION TX DELIVERY LVL 3: CPT | Performed by: RADIOLOGY

## 2019-02-28 PROCEDURE — 77307 TELETHX ISODOSE PLAN CPLX: CPT | Mod: 26 | Performed by: RADIOLOGY

## 2019-03-01 ENCOUNTER — HOSPITAL ENCOUNTER (OUTPATIENT)
Dept: RADIATION ONCOLOGY | Facility: MEDICAL CENTER | Age: 44
End: 2019-03-01

## 2019-03-01 ENCOUNTER — HOSPITAL ENCOUNTER (OUTPATIENT)
Dept: RADIATION ONCOLOGY | Facility: MEDICAL CENTER | Age: 44
End: 2019-03-31
Attending: RADIOLOGY
Payer: COMMERCIAL

## 2019-03-01 PROCEDURE — 77412 RADIATION TX DELIVERY LVL 3: CPT | Performed by: RADIOLOGY

## 2019-03-01 PROCEDURE — 77417 THER RADIOLOGY PORT IMAGE(S): CPT | Performed by: RADIOLOGY

## 2019-03-04 ENCOUNTER — HOSPITAL ENCOUNTER (OUTPATIENT)
Dept: RADIATION ONCOLOGY | Facility: MEDICAL CENTER | Age: 44
End: 2019-03-04

## 2019-03-04 PROCEDURE — 77427 RADIATION TX MANAGEMENT X5: CPT | Performed by: RADIOLOGY

## 2019-03-04 PROCEDURE — 77417 THER RADIOLOGY PORT IMAGE(S): CPT | Performed by: RADIOLOGY

## 2019-03-04 PROCEDURE — 77412 RADIATION TX DELIVERY LVL 3: CPT | Performed by: RADIOLOGY

## 2019-03-05 ENCOUNTER — HOSPITAL ENCOUNTER (OUTPATIENT)
Dept: RADIATION ONCOLOGY | Facility: MEDICAL CENTER | Age: 44
End: 2019-03-05

## 2019-03-05 PROCEDURE — 77280 THER RAD SIMULAJ FIELD SMPL: CPT | Mod: 26 | Performed by: RADIOLOGY

## 2019-03-05 PROCEDURE — 77412 RADIATION TX DELIVERY LVL 3: CPT | Performed by: RADIOLOGY

## 2019-03-05 PROCEDURE — 77280 THER RAD SIMULAJ FIELD SMPL: CPT | Performed by: RADIOLOGY

## 2019-03-06 PROCEDURE — 77014 PR CT GUIDANCE PLACEMENT RAD THERAPY FIELDS: CPT | Mod: 26 | Performed by: RADIOLOGY

## 2019-03-06 PROCEDURE — 77387 GUIDANCE FOR RADJ TX DLVR: CPT | Performed by: RADIOLOGY

## 2019-03-06 PROCEDURE — 77412 RADIATION TX DELIVERY LVL 3: CPT | Performed by: RADIOLOGY

## 2019-03-07 ENCOUNTER — HOSPITAL ENCOUNTER (OUTPATIENT)
Dept: RADIATION ONCOLOGY | Facility: MEDICAL CENTER | Age: 44
End: 2019-03-07

## 2019-03-07 PROCEDURE — 77336 RADIATION PHYSICS CONSULT: CPT | Performed by: RADIOLOGY

## 2019-03-07 PROCEDURE — 77412 RADIATION TX DELIVERY LVL 3: CPT | Performed by: RADIOLOGY

## 2019-03-07 PROCEDURE — 77014 PR CT GUIDANCE PLACEMENT RAD THERAPY FIELDS: CPT | Mod: 26 | Performed by: RADIOLOGY

## 2019-03-07 PROCEDURE — 77387 GUIDANCE FOR RADJ TX DLVR: CPT | Performed by: RADIOLOGY

## 2019-03-08 ENCOUNTER — HOSPITAL ENCOUNTER (OUTPATIENT)
Dept: RADIATION ONCOLOGY | Facility: MEDICAL CENTER | Age: 44
End: 2019-03-08

## 2019-03-08 PROCEDURE — 77412 RADIATION TX DELIVERY LVL 3: CPT | Performed by: RADIOLOGY

## 2019-03-08 PROCEDURE — 77014 PR CT GUIDANCE PLACEMENT RAD THERAPY FIELDS: CPT | Mod: 26 | Performed by: RADIOLOGY

## 2019-03-08 PROCEDURE — 77387 GUIDANCE FOR RADJ TX DLVR: CPT | Performed by: RADIOLOGY

## 2019-03-11 ENCOUNTER — HOSPITAL ENCOUNTER (OUTPATIENT)
Dept: RADIATION ONCOLOGY | Facility: MEDICAL CENTER | Age: 44
End: 2019-03-11

## 2019-03-11 PROCEDURE — 77387 GUIDANCE FOR RADJ TX DLVR: CPT | Performed by: RADIOLOGY

## 2019-03-11 PROCEDURE — 77412 RADIATION TX DELIVERY LVL 3: CPT | Performed by: RADIOLOGY

## 2019-03-11 PROCEDURE — 77427 RADIATION TX MANAGEMENT X5: CPT | Performed by: RADIOLOGY

## 2019-03-11 PROCEDURE — 77014 PR CT GUIDANCE PLACEMENT RAD THERAPY FIELDS: CPT | Mod: 26 | Performed by: RADIOLOGY

## 2019-03-12 ENCOUNTER — HOSPITAL ENCOUNTER (OUTPATIENT)
Dept: RADIATION ONCOLOGY | Facility: MEDICAL CENTER | Age: 44
End: 2019-03-12

## 2019-03-12 PROCEDURE — 77412 RADIATION TX DELIVERY LVL 3: CPT | Performed by: RADIOLOGY

## 2019-03-12 PROCEDURE — 77014 PR CT GUIDANCE PLACEMENT RAD THERAPY FIELDS: CPT | Mod: 26 | Performed by: RADIOLOGY

## 2019-03-12 PROCEDURE — 77387 GUIDANCE FOR RADJ TX DLVR: CPT | Performed by: RADIOLOGY

## 2019-03-13 PROCEDURE — 77014 PR CT GUIDANCE PLACEMENT RAD THERAPY FIELDS: CPT | Mod: 26 | Performed by: RADIOLOGY

## 2019-03-13 PROCEDURE — 77387 GUIDANCE FOR RADJ TX DLVR: CPT | Performed by: RADIOLOGY

## 2019-03-13 PROCEDURE — 77412 RADIATION TX DELIVERY LVL 3: CPT | Performed by: RADIOLOGY

## 2019-03-14 ENCOUNTER — HOSPITAL ENCOUNTER (OUTPATIENT)
Dept: RADIATION ONCOLOGY | Facility: MEDICAL CENTER | Age: 44
End: 2019-03-14

## 2019-03-14 PROCEDURE — 77412 RADIATION TX DELIVERY LVL 3: CPT | Performed by: RADIOLOGY

## 2019-03-14 PROCEDURE — 77336 RADIATION PHYSICS CONSULT: CPT | Performed by: RADIOLOGY

## 2019-03-14 PROCEDURE — 77387 GUIDANCE FOR RADJ TX DLVR: CPT | Performed by: RADIOLOGY

## 2019-03-14 PROCEDURE — 77427 RADIATION TX MANAGEMENT X5: CPT | Performed by: RADIOLOGY

## 2019-03-14 PROCEDURE — 77014 PR CT GUIDANCE PLACEMENT RAD THERAPY FIELDS: CPT | Mod: 26 | Performed by: RADIOLOGY

## 2019-04-08 RX ORDER — ZOLEDRONIC ACID 0.04 MG/ML
4 INJECTION, SOLUTION INTRAVENOUS ONCE
Status: CANCELLED | OUTPATIENT
Start: 2019-04-12 | End: 2019-04-08

## 2019-04-08 NOTE — PROGRESS NOTES
Triage RN spoke with MD office. Pt is due for a f/u visit. RN scheduled pt to be seen on 4/12 and have Zometa infusion with [facility] after seeing MD on 4/12. Ana Barreto CNA (Kazakh speaking) called pt and discussed that appt for today will be rescheduled for 4/12 at 1600 as the pt needs to see the provider at 1440 on 4/12, she reported that pt verbalized POC.

## 2019-04-10 ENCOUNTER — TELEPHONE (OUTPATIENT)
Dept: HEMATOLOGY ONCOLOGY | Facility: MEDICAL CENTER | Age: 44
End: 2019-04-10

## 2019-04-10 NOTE — TELEPHONE ENCOUNTER
RN verified lab orders are in computer. Patient will need to complete labs appointment needs to be rescheduled. MA notified to contact patient and r/s

## 2019-04-12 ENCOUNTER — OFFICE VISIT (OUTPATIENT)
Dept: HEMATOLOGY ONCOLOGY | Facility: MEDICAL CENTER | Age: 44
End: 2019-04-12
Payer: MEDICAID

## 2019-04-12 ENCOUNTER — TELEPHONE (OUTPATIENT)
Dept: HEMATOLOGY ONCOLOGY | Facility: MEDICAL CENTER | Age: 44
End: 2019-04-12

## 2019-04-12 ENCOUNTER — APPOINTMENT (OUTPATIENT)
Dept: ONCOLOGY | Facility: MEDICAL CENTER | Age: 44
End: 2019-04-12
Attending: INTERNAL MEDICINE
Payer: MEDICAID

## 2019-04-12 ENCOUNTER — APPOINTMENT (OUTPATIENT)
Dept: ONCOLOGY | Facility: MEDICAL CENTER | Age: 44
End: 2019-04-12
Payer: MEDICAID

## 2019-04-12 VITALS
TEMPERATURE: 98.4 F | DIASTOLIC BLOOD PRESSURE: 78 MMHG | WEIGHT: 227.29 LBS | HEART RATE: 84 BPM | RESPIRATION RATE: 20 BRPM | SYSTOLIC BLOOD PRESSURE: 136 MMHG | OXYGEN SATURATION: 98 % | BODY MASS INDEX: 31.82 KG/M2 | HEIGHT: 71 IN

## 2019-04-12 DIAGNOSIS — C90.30 PLASMACYTOMA OF BONE (HCC): ICD-10-CM

## 2019-04-12 DIAGNOSIS — M84.522D PATHOLOGICAL FRACTURE OF LEFT HUMERUS DUE TO NEOPLASTIC DISEASE WITH ROUTINE HEALING, SUBSEQUENT ENCOUNTER: ICD-10-CM

## 2019-04-12 DIAGNOSIS — C90.30 PLASMACYTOMA (HCC): ICD-10-CM

## 2019-04-12 PROCEDURE — 99215 OFFICE O/P EST HI 40 MIN: CPT | Performed by: INTERNAL MEDICINE

## 2019-04-12 ASSESSMENT — PAIN SCALES - GENERAL: PAINLEVEL: NO PAIN

## 2019-04-12 NOTE — PROGRESS NOTES
Date of visit: 4/12/2019  2:37 PM      Chief Complaint-       Identification/Prior relevant history: Abiodun Carbajal  is a 43 y.o. year old male who is here for follow-up of    Interim history    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    left humerus s/p ORIF       Past surgical history:       Past Surgical History:   Procedure Laterality Date   • BONE MARROW BIOPSY, NDL/TROCAR Left 1/9/2019    Procedure: BONE MARROW BIOPSY, NDL/TROCAR;  Surgeon: Ambreen Whelan M.D.;  Location: ENDOSCOPY [facility];  Service: Orthopedics   • BONE MARROW ASPIRATION Left 1/9/2019    Procedure: BONE MARROW ASPIRATION    /    Shyla;  Surgeon: Ambreen Whelan M.D.;  Location: ENDOSCOPY [facility];  Service: Orthopedics   • HUMERUS NAILING INTRAMEDULLARY Left 12/28/2018    Procedure: ORIF LEFT HUMERUS;  Surgeon: Niraj Ayon M.D.;  Location: SURGERY [facility];  Service: Orthopedics   • EXCISION MASS/BIOPSY ORTHOPEDICS Left 12/12/2018    Procedure: Incisional biopsy of left humerus;  Surgeon: Niraj Ayon M.D.;  Location: SURGERY [facility];  Service: Orthopedics       Allergies:       Patient has no known allergies.    Medications:         Current Outpatient Prescriptions   Medication Sig Dispense Refill   • enoxaparin (LOVENOX) 30 MG/0.3ML Solution inj Inject 0.3 mL as instructed every 12 hours. 56 Syringe 0   • docusate sodium 100 MG Cap Take 100 mg by mouth 2 Times a Day. 60 Cap 0   • ferrous sulfate 325 (65 Fe) MG EC tablet Take 1 Tab by mouth 3 times a day, with meals. 90 Tab 1   • metformin ER modified (GLUMETZA) 500 MG TABLET SR 24 HR Take  by mouth 2 Times a Day.       No current facility-administered medications for this visit.          Social History:     Social History     Social History   • Marital status:      Spouse name: N/A   • Number of children: N/A   • Years of  education: N/A     Occupational History   • Not on file.     Social History Main Topics   • Smoking status: Never Smoker   • Smokeless tobacco: Never Used   • Alcohol use No   • Drug use: No   • Sexual activity: Not on file     Other Topics Concern   • Not on file     Social History Narrative   • No narrative on file       Family History:      Family History   Problem Relation Age of Onset   • Cancer Father         unknown       Review of Systems:  All other review of systems are negative except what was mentioned above in the HPI.    Constitutional: Negative for fever, chills, weight loss and malaise/fatigue.    HEENT: No new auditory or visual complaints. No sore throat and neck pain.     Respiratory: Negative for cough, sputum production, shortness of breath and wheezing.    Cardiovascular: Negative for chest pain, palpitations, orthopnea and leg swelling.    Gastrointestinal: Negative for heartburn, nausea, vomiting and abdominal pain.    Genitourinary: Negative for dysuria, hematuria    Musculoskeletal: No new arthralgias or myalgias   Skin: Negative for rash and itching.    Neurological: Negative for focal weakness and headaches.    Endo/Heme/Allergies: No abnormal bleed/bruise.    Psychiatric/Behavioral: No new depression/anxiety.    Physical Exam:  Vitals: There were no vitals taken for this visit.    General: Not in acute distress, alert and oriented x 3  HEENT: No pallor, icterus. Oropharynx clear.   Neck: Supple, no palpable masses.  Lymph nodes: No palpable cervical, supraclavicular, axillary or inguinal lymphadenopathy.    CVS: regular rate and rhythm, no rubs or gallops  RESP: Clear to auscultate bilaterally, no wheezing or crackles.   ABD: Soft, non tender, non distended, positive bowel sounds, no palpable organomegaly  EXT: No edema or cyanosis  CNS: Alert and oriented x3, No focal deficits.  Skin- No rash      Labs:   No visits with results within 1 Week(s) from this visit.   Latest known visit with  results is:   Admission on 01/09/2019, Discharged on 01/09/2019   Component Date Value Ref Range Status   • Reticulocyte Count 01/09/2019 5.4* 0.8 - 2.1 % Final   • Retic, Absolute 01/09/2019 0.24* 0.04 - 0.06 M/uL Final   • Imm. Reticulocyte Fraction 01/09/2019 35.5* 9.3 - 17.4 % Final   • Retic Hgb Equivalent 01/09/2019 20.3* 29.0 - 35.0 pg/cell Final   • Neutrophils-Polys 01/09/2019 78.10* 44.00 - 72.00 % Final   • Lymphocytes 01/09/2019 14.60* 22.00 - 41.00 % Final   • Monocytes 01/09/2019 4.70  0.00 - 13.40 % Final   • Eosinophils 01/09/2019 1.70  0.00 - 6.90 % Final   • Basophils 01/09/2019 0.40  0.00 - 1.80 % Final   • Immature Granulocytes 01/09/2019 0.50  0.00 - 0.90 % Final   • Nucleated RBC 01/09/2019 0.40  /100 WBC Final   • Neutrophils (Absolute) 01/09/2019 6.04  1.82 - 7.42 K/uL Final    Includes immature neutrophils, if present.   • Lymphs (Absolute) 01/09/2019 1.13  1.00 - 4.80 K/uL Final   • Monos (Absolute) 01/09/2019 0.36  0.00 - 0.85 K/uL Final   • Eos (Absolute) 01/09/2019 0.13  0.00 - 0.51 K/uL Final   • Baso (Absolute) 01/09/2019 0.03  0.00 - 0.12 K/uL Final   • Immature Granulocytes (abs) 01/09/2019 0.04  0.00 - 0.11 K/uL Final   • NRBC (Absolute) 01/09/2019 0.03  K/uL Final   • WBC 01/09/2019 7.7  4.8 - 10.8 K/uL Final   • RBC 01/09/2019 4.38* 4.70 - 6.10 M/uL Final   • Hemoglobin 01/09/2019 9.3* 14.0 - 18.0 g/dL Final   • Hematocrit 01/09/2019 31.3* 42.0 - 52.0 % Final   • MCV 01/09/2019 71.5* 81.4 - 97.8 fL Final   • MCH 01/09/2019 21.2* 27.0 - 33.0 pg Final   • MCHC 01/09/2019 29.7* 33.7 - 35.3 g/dL Final   • RDW 01/09/2019 47.1  35.9 - 50.0 fL Final   • Platelet Count 01/09/2019 518* 164 - 446 K/uL Final   • MPV 01/09/2019 9.1  9.0 - 12.9 fL Final   • Hypochromia 01/09/2019 1+   Final   • Microcytosis 01/09/2019 2+   Final   • Glucose - Accu-Ck 01/09/2019 145* 65 - 99 mg/dL Final   • Peripheral Smear Review 01/09/2019 see below   Final    Comment: Due to instrument suspect flags, further  review of peripheral smear is  indicated on this patient sample. This review may or may not result in  abnormal findings.     • Plt Estimation 01/09/2019 Increased   Final   • RBC Morphology 01/09/2019 Present   Final   • Polychromia 01/09/2019 2+   Final   • Poikilocytosis 01/09/2019 2+   Final   • Ovalocytes 01/09/2019 2+   Final   • Tear Drop Cells 01/09/2019 2+   Final   • Comments-Diff 01/09/2019 see below   Final    Results have been verified by peripheral blood smear review.   • Pathology Request 01/09/2019 Sent to [facility]   Final             Assessment and Plan:      He agreed and verbalized  agreement and understanding with the current plan.  I answered all questions and concerns at this time         Please note that this dictation was created using voice recognition software. I have made every reasonable attempt to correct obvious errors, but I expect that there are errors of grammar and possibly content that I did not discover before finalizing the note.      SIGNATURES:  Ant Mansfield    CC:  Solange Alarcon D.O.  No ref. provider found

## 2019-04-12 NOTE — TELEPHONE ENCOUNTER
Spoke to the patient advice that we have scheduled him for an appointment with us on 07/15/2019 at 3:30. Patient agreed with appointment.

## 2019-04-15 ENCOUNTER — TELEPHONE (OUTPATIENT)
Dept: HEMATOLOGY ONCOLOGY | Facility: MEDICAL CENTER | Age: 44
End: 2019-04-15

## 2019-04-15 NOTE — TELEPHONE ENCOUNTER
Spoke to Abiodun in Japanese and I let him know that for his bone survey he can just walk in to get x-ray to get it done a couple of days before his appointment with us. Patient agreed and will do the bone survey about 10 days prior to appointment and the labs as well.

## 2019-04-16 ENCOUNTER — TELEPHONE (OUTPATIENT)
Dept: HEMATOLOGY ONCOLOGY | Facility: MEDICAL CENTER | Age: 44
End: 2019-04-16

## 2019-04-16 PROBLEM — M84.529A: Status: ACTIVE | Noted: 2019-04-16

## 2019-04-16 NOTE — TELEPHONE ENCOUNTER
Patient has a language barrier and MA to call patient to let him know that he needs to get labs performed asap. Dr. Mansfield has ordered the labs and needs to review them.  He needs to go to a [facility] to get these completed. MA will call patient and provide him with the information

## 2019-04-16 NOTE — TELEPHONE ENCOUNTER
Patient returned MA Called regarding completed some labs prior to the next his next visit. told him complete them ASAP

## 2019-04-16 NOTE — TELEPHONE ENCOUNTER
1st Attempt    Left voice message for the patient requesting him to call us back in regards to his labs that we need him to complete. Left voice message in Croatian.

## 2019-04-17 ENCOUNTER — HOSPITAL ENCOUNTER (OUTPATIENT)
Dept: LAB | Facility: MEDICAL CENTER | Age: 44
End: 2019-04-17
Attending: INTERNAL MEDICINE
Payer: COMMERCIAL

## 2019-04-17 DIAGNOSIS — C90.30 PLASMACYTOMA OF BONE (HCC): ICD-10-CM

## 2019-04-17 DIAGNOSIS — C90.30 PLASMACYTOMA (HCC): ICD-10-CM

## 2019-04-17 LAB
ALBUMIN SERPL BCP-MCNC: 4.6 G/DL (ref 3.2–4.9)
ALBUMIN/GLOB SERPL: 1.6 G/DL
ALP SERPL-CCNC: 53 U/L (ref 30–99)
ALT SERPL-CCNC: 46 U/L (ref 2–50)
ANION GAP SERPL CALC-SCNC: 9 MMOL/L (ref 0–11.9)
ANISOCYTOSIS BLD QL SMEAR: ABNORMAL
AST SERPL-CCNC: 32 U/L (ref 12–45)
BASOPHILS # BLD AUTO: 0 % (ref 0–1.8)
BASOPHILS # BLD: 0 K/UL (ref 0–0.12)
BILIRUB SERPL-MCNC: 0.7 MG/DL (ref 0.1–1.5)
BUN SERPL-MCNC: 11 MG/DL (ref 8–22)
CALCIUM SERPL-MCNC: 9.4 MG/DL (ref 8.5–10.5)
CHLORIDE SERPL-SCNC: 103 MMOL/L (ref 96–112)
CO2 SERPL-SCNC: 25 MMOL/L (ref 20–33)
CREAT SERPL-MCNC: 0.87 MG/DL (ref 0.5–1.4)
DACRYOCYTES BLD QL SMEAR: NORMAL
EOSINOPHIL # BLD AUTO: 0.15 K/UL (ref 0–0.51)
EOSINOPHIL NFR BLD: 2.6 % (ref 0–6.9)
ERYTHROCYTE [DISTWIDTH] IN BLOOD BY AUTOMATED COUNT: 41.1 FL (ref 35.9–50)
GLOBULIN SER CALC-MCNC: 2.8 G/DL (ref 1.9–3.5)
GLUCOSE SERPL-MCNC: 122 MG/DL (ref 65–99)
HCT VFR BLD AUTO: 46.9 % (ref 42–52)
HGB BLD-MCNC: 13.9 G/DL (ref 14–18)
LYMPHOCYTES # BLD AUTO: 1.53 K/UL (ref 1–4.8)
LYMPHOCYTES NFR BLD: 27.4 % (ref 22–41)
MANUAL DIFF BLD: NORMAL
MCH RBC QN AUTO: 19.8 PG (ref 27–33)
MCHC RBC AUTO-ENTMCNC: 29.6 G/DL (ref 33.7–35.3)
MCV RBC AUTO: 66.7 FL (ref 81.4–97.8)
MICROCYTES BLD QL SMEAR: ABNORMAL
MONOCYTES # BLD AUTO: 0.15 K/UL (ref 0–0.85)
MONOCYTES NFR BLD AUTO: 2.7 % (ref 0–13.4)
MORPHOLOGY BLD-IMP: NORMAL
NEUTROPHILS # BLD AUTO: 3.77 K/UL (ref 1.82–7.42)
NEUTROPHILS NFR BLD: 67.3 % (ref 44–72)
NRBC # BLD AUTO: 0 K/UL
NRBC BLD-RTO: 0 /100 WBC
OVALOCYTES BLD QL SMEAR: NORMAL
PLATELET # BLD AUTO: 323 K/UL (ref 164–446)
PLATELET BLD QL SMEAR: NORMAL
POIKILOCYTOSIS BLD QL SMEAR: NORMAL
POTASSIUM SERPL-SCNC: 3.8 MMOL/L (ref 3.6–5.5)
PROT SERPL-MCNC: 7.4 G/DL (ref 6–8.2)
RBC # BLD AUTO: 7.03 M/UL (ref 4.7–6.1)
RBC BLD AUTO: PRESENT
SODIUM SERPL-SCNC: 137 MMOL/L (ref 135–145)
WBC # BLD AUTO: 5.6 K/UL (ref 4.8–10.8)

## 2019-04-17 PROCEDURE — 36415 COLL VENOUS BLD VENIPUNCTURE: CPT

## 2019-04-17 PROCEDURE — 84160 ASSAY OF PROTEIN ANY SOURCE: CPT

## 2019-04-17 PROCEDURE — 80053 COMPREHEN METABOLIC PANEL: CPT

## 2019-04-17 PROCEDURE — 83883 ASSAY NEPHELOMETRY NOT SPEC: CPT | Mod: 91

## 2019-04-17 PROCEDURE — 84165 PROTEIN E-PHORESIS SERUM: CPT

## 2019-04-17 PROCEDURE — 85027 COMPLETE CBC AUTOMATED: CPT

## 2019-04-17 PROCEDURE — 85007 BL SMEAR W/DIFF WBC COUNT: CPT

## 2019-04-17 PROCEDURE — 84156 ASSAY OF PROTEIN URINE: CPT

## 2019-04-20 LAB
ALBUMIN 24H UR QL ELPH: DETECTED
ALBUMIN SERPL-MCNC: 4.48 G/DL (ref 3.75–5.01)
ALPHA1 GLOB 24H UR QL ELPH: DETECTED
ALPHA1 GLOB SERPL ELPH-MCNC: 0.31 G/DL (ref 0.19–0.46)
ALPHA2 GLOB 24H UR QL ELPH: DETECTED
ALPHA2 GLOB SERPL ELPH-MCNC: 0.73 G/DL (ref 0.48–1.05)
B-GLOBULIN SERPL ELPH-MCNC: 1.31 G/DL (ref 0.48–1.1)
B-GLOBULIN UR QL ELPH: DETECTED
COLLECT DURATION TIME SPEC: ABNORMAL H
GAMMA GLOB SERPL ELPH-MCNC: 0.77 G/DL (ref 0.62–1.51)
GAMMA GLOB UR QL ELPH: DETECTED
INTERPRETATION SERPL IFE-IMP: ABNORMAL
INTERPRETATION UR IFE-IMP: ABNORMAL
KAPPA LC FREE SER-MCNC: 1.29 MG/DL (ref 0.33–1.94)
KAPPA LC FREE UR-MCNC: 18.7 MG/DL (ref 0.14–2.42)
KAPPA LC FREE/LAMBDA FREE SER NEPH: 0.82 {RATIO} (ref 0.26–1.65)
KAPPA LC FREE/LAMBDA FREE UR: 4.28 RATIO (ref 2.04–10.37)
LAMBDA LC FREE SERPL-MCNC: 1.57 MG/DL (ref 0.57–2.63)
LAMBDA LC FREE UR-MCNC: 4.37 MG/DL (ref 0.02–0.67)
MONOCLON BAND OBS SERPL: ABNORMAL
PATHOLOGY STUDY: ABNORMAL
PROT 24H UR-MRATE: ABNORMAL MG/D (ref 10–140)
PROT SERPL-MCNC: 7.6 G/DL (ref 6–8.3)
TOTAL VOLUME 1105: ABNORMAL ML

## 2019-04-22 ENCOUNTER — TELEPHONE (OUTPATIENT)
Dept: HEMATOLOGY ONCOLOGY | Facility: MEDICAL CENTER | Age: 44
End: 2019-04-22

## 2019-04-23 ENCOUNTER — OFFICE VISIT (OUTPATIENT)
Dept: HEMATOLOGY ONCOLOGY | Facility: MEDICAL CENTER | Age: 44
End: 2019-04-23
Payer: MEDICAID

## 2019-04-23 VITALS
TEMPERATURE: 98.9 F | DIASTOLIC BLOOD PRESSURE: 80 MMHG | RESPIRATION RATE: 14 BRPM | WEIGHT: 228.18 LBS | SYSTOLIC BLOOD PRESSURE: 116 MMHG | HEIGHT: 71 IN | BODY MASS INDEX: 31.94 KG/M2 | OXYGEN SATURATION: 98 % | HEART RATE: 92 BPM

## 2019-04-23 DIAGNOSIS — C90.30 PLASMACYTOMA (HCC): ICD-10-CM

## 2019-04-23 DIAGNOSIS — M84.522D PATHOLOGICAL FRACTURE OF LEFT HUMERUS DUE TO NEOPLASTIC DISEASE WITH ROUTINE HEALING, SUBSEQUENT ENCOUNTER: ICD-10-CM

## 2019-04-23 PROCEDURE — 99214 OFFICE O/P EST MOD 30 MIN: CPT | Performed by: INTERNAL MEDICINE

## 2019-04-23 RX ORDER — INSULIN DETEMIR 100 [IU]/ML
INJECTION, SOLUTION SUBCUTANEOUS
Refills: 3 | COMMUNITY
Start: 2019-04-08

## 2019-04-23 RX ORDER — BLOOD-GLUCOSE METER
EACH MISCELLANEOUS
Refills: 3 | COMMUNITY
Start: 2019-04-02

## 2019-04-23 ASSESSMENT — PAIN SCALES - GENERAL: PAINLEVEL: NO PAIN

## 2019-04-23 NOTE — LETTER
2019        Abiodun Carbajal  : 1975        To Whom It May Concern:       Mr. Antonio Carbajal is a patient of mine. He may return to work with the following restrictions: he may not lift more the twenty five pounds.                           ___________________________________    Ant Mansfield, Medical Oncologist

## 2019-04-23 NOTE — PROGRESS NOTES
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 Urine FLC- MOnoclonal light chains present.  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    left humerus s/p ORIF       Past surgical history:       Past Surgical History:   Procedure Laterality Date   • BONE MARROW BIOPSY, NDL/TROCAR Left 1/9/2019    Procedure: BONE MARROW BIOPSY, NDL/TROCAR;  Surgeon: Ambreen Whelan M.D.;  Location: [facility];  Service: Orthopedics   • BONE MARROW ASPIRATION Left 1/9/2019    Procedure: BONE MARROW ASPIRATION    /    Shyla;  Surgeon: Ambreen Whelan M.D.;  Location: [facility];  Service: Orthopedics   • HUMERUS NAILING INTRAMEDULLARY Left 12/28/2018    Procedure: ORIF LEFT HUMERUS;  Surgeon: Niraj Ayon M.D.;  Location: SURGERY [facility];  Service: Orthopedics   • EXCISION MASS/BIOPSY ORTHOPEDICS Left 12/12/2018    Procedure: Incisional biopsy of left humerus;  Surgeon: Niraj Ayon M.D.;  Location: SURGERY [facility];  Service: Orthopedics       Allergies:       Patient has no known allergies.    Medications:         Current Outpatient Prescriptions   Medication Sig Dispense Refill   • metformin (GLUCOPHAGE) 1000 MG tablet Take 1,000 mg by mouth 2 times a day, with meals.     • GLUCOCARD EXPRESSION TEST strip CHECK SUGARS 1 TIME DAILY FOR BLOOD SUGAR CONTROL IN DIABETES; E 11 9  3   • LEVEMIR FLEXTOUCH 100 UNIT/ML Solution Pen-injector injection INJECT 15 UNITS SUBCUTANEOUSLY EVERY EVENING, INCREASE BY 2 UNITS EVERY 2ND NIGHT UNTIL YOUR FASTING BLOOD SUGAR IS LESS THAN 130  3   • enoxaparin  (LOVENOX) 30 MG/0.3ML Solution inj Inject 0.3 mL as instructed every 12 hours. (Patient not taking: Reported on 4/12/2019) 56 Syringe 0   • docusate sodium 100 MG Cap Take 100 mg by mouth 2 Times a Day. (Patient not taking: Reported on 4/12/2019) 60 Cap 0   • ferrous sulfate 325 (65 Fe) MG EC tablet Take 1 Tab by mouth 3 times a day, with meals. (Patient not taking: Reported on 4/12/2019) 90 Tab 1   • metformin ER modified (GLUMETZA) 500 MG TABLET SR 24 HR Take  by mouth 2 Times a Day.       No current facility-administered medications for this visit.          Social History:     Social History     Social History   • Marital status:      Spouse name: N/A   • Number of children: N/A   • Years of education: N/A     Occupational History   • Not on file.     Social History Main Topics   • Smoking status: Never Smoker   • Smokeless tobacco: Never Used   • Alcohol use No   • Drug use: No   • Sexual activity: Not on file     Other Topics Concern   • Not on file     Social History Narrative   • No narrative on file       Family History:      Family History   Problem Relation Age of Onset   • Cancer Father         unknown       Review of Systems:  All other review of systems are negative except what was mentioned above in the HPI.    Constitutional: Negative for fever, chills, weight loss and malaise/fatigue.    HEENT: No new auditory or visual complaints. No sore throat and neck pain.     Respiratory: Negative for cough, sputum production, shortness of breath and wheezing.    Cardiovascular: Negative for chest pain, palpitations, orthopnea and leg swelling.    Gastrointestinal: Negative for heartburn, nausea, vomiting and abdominal pain.    Genitourinary: Negative for dysuria, hematuria    Musculoskeletal: No new arthralgias or myalgias   Skin: Negative for rash and itching.    Neurological: Negative for focal weakness and headaches.    Endo/Heme/Allergies: No abnormal bleed/bruise.    Psychiatric/Behavioral: No new  "depression/anxiety.    Physical Exam:  Vitals: /80 (BP Location: Right arm, Patient Position: Sitting, BP Cuff Size: Large adult)   Pulse 92   Temp 37.2 °C (98.9 °F) (Temporal)   Resp 14   Ht 1.803 m (5' 11\")   Wt 103.5 kg (228 lb 2.8 oz)   SpO2 98%   BMI 31.82 kg/m²     General: Not in acute distress, alert and oriented x 3  HEENT: No pallor, icterus. Oropharynx clear.   Neck: Supple, no palpable masses.  Lymph nodes: No palpable cervical, supraclavicular, axillary or inguinal lymphadenopathy.    CVS: regular rate and rhythm, no rubs or gallops  RESP: Clear to auscultate bilaterally, no wheezing or crackles.   ABD: Soft, non tender, non distended, positive bowel sounds, no palpable organomegaly  EXT: No edema or cyanosis  CNS: Alert and oriented x3, No focal deficits.  Skin- No rash      Labs:   Hospital Outpatient Visit on 04/17/2019   Component Date Value Ref Range Status   • WBC 04/17/2019 5.6  4.8 - 10.8 K/uL Final   • RBC 04/17/2019 7.03* 4.70 - 6.10 M/uL Final   • Hemoglobin 04/17/2019 13.9* 14.0 - 18.0 g/dL Final   • Hematocrit 04/17/2019 46.9  42.0 - 52.0 % Final   • MCV 04/17/2019 66.7* 81.4 - 97.8 fL Final   • MCH 04/17/2019 19.8* 27.0 - 33.0 pg Final   • MCHC 04/17/2019 29.6* 33.7 - 35.3 g/dL Final   • RDW 04/17/2019 41.1  35.9 - 50.0 fL Final   • Platelet Count 04/17/2019 323  164 - 446 K/uL Final   • Neutrophils-Polys 04/17/2019 67.30  44.00 - 72.00 % Final   • Lymphocytes 04/17/2019 27.40  22.00 - 41.00 % Final   • Monocytes 04/17/2019 2.70  0.00 - 13.40 % Final   • Eosinophils 04/17/2019 2.60  0.00 - 6.90 % Final   • Basophils 04/17/2019 0.00  0.00 - 1.80 % Final   • Nucleated RBC 04/17/2019 0.00  /100 WBC Final   • Neutrophils (Absolute) 04/17/2019 3.77  1.82 - 7.42 K/uL Final    Includes immature neutrophils, if present.   • Lymphs (Absolute) 04/17/2019 1.53  1.00 - 4.80 K/uL Final   • Monos (Absolute) 04/17/2019 0.15  0.00 - 0.85 K/uL Final   • Eos (Absolute) 04/17/2019 0.15  0.00 - " 0.51 K/uL Final   • Baso (Absolute) 04/17/2019 0.00  0.00 - 0.12 K/uL Final   • NRBC (Absolute) 04/17/2019 0.00  K/uL Final   • Anisocytosis 04/17/2019 1+   Final   • Microcytosis 04/17/2019 1+   Final   • Sodium 04/17/2019 137  135 - 145 mmol/L Final   • Potassium 04/17/2019 3.8  3.6 - 5.5 mmol/L Final   • Chloride 04/17/2019 103  96 - 112 mmol/L Final   • Co2 04/17/2019 25  20 - 33 mmol/L Final   • Anion Gap 04/17/2019 9.0  0.0 - 11.9 Final   • Glucose 04/17/2019 122* 65 - 99 mg/dL Final   • Bun 04/17/2019 11  8 - 22 mg/dL Final   • Creatinine 04/17/2019 0.87  0.50 - 1.40 mg/dL Final   • Calcium 04/17/2019 9.4  8.5 - 10.5 mg/dL Final   • AST(SGOT) 04/17/2019 32  12 - 45 U/L Final   • ALT(SGPT) 04/17/2019 46  2 - 50 U/L Final   • Alkaline Phosphatase 04/17/2019 53  30 - 99 U/L Final   • Total Bilirubin 04/17/2019 0.7  0.1 - 1.5 mg/dL Final   • Albumin 04/17/2019 4.6  3.2 - 4.9 g/dL Final   • Total Protein 04/17/2019 7.4  6.0 - 8.2 g/dL Final   • Globulin 04/17/2019 2.8  1.9 - 3.5 g/dL Final   • A-G Ratio 04/17/2019 1.6  g/dL Final   • Albumin 04/17/2019 4.48  3.75 - 5.01 g/dL Final   • Alpha-1 Globulin 04/17/2019 0.31  0.19 - 0.46 g/dL Final   • Alpha-2 Globulin 04/17/2019 0.73  0.48 - 1.05 g/dL Final   • Beta Globulin 04/17/2019 1.31* 0.48 - 1.10 g/dL Final   • Gamma Globulin 04/17/2019 0.77  0.62 - 1.51 g/dL Final   • Interpretation 04/17/2019 See Note   Final    Increased beta region.  No monoclonal proteins seen.   • ALAYNA Reflex 04/17/2019 Not Done   Final    Comment: Caution: Reflex to Immunofixation electrophoresis (ALAYNA)is not  indicated per results of protein electrophoresis. Immunofixation  electrophoresis is a more sensitive technique than serum protein  electrophoresis for the identification of small M-proteins found  in patients with amyloidosis, early or treated myeloma and  macroglobulinemia, MGUS, solitary plasmacytoma,and extramedullary  plasmacytoma.     • Total Protein 04/17/2019 7.60  6.00 - 8.30  g/dL Final   • EER Serum Prot. Electro. Reflex 04/17/2019 See Note   Final    Comment: Access SwitchNote Enhanced Report using either link below:  -Direct access: [email or web address]  -Enter Username, Password: [email or web address]  Username: [email or web address]  Password: [email or web address]  Performed by [facility],  Formerly named [facility] [address] [facility] [address] [phone]  [email or web address], Joe Ann MD - Lab. Director     • Free Kappa Light Chains 04/17/2019 1.29  0.33 - 1.94 mg/dL Final   • Free Lambda Light Chains 04/17/2019 1.57  0.57 - 2.63 mg/dL Final   • Kappa-Lambda Ratio 04/17/2019 0.82  0.26 - 1.65 Final    Comment: Performed by [facility],  [address] [facility] [place] [address] [phone]  [email or web address], Joe Ann MD - Lab. Director     • Specimen Type 04/17/2019 Random   Final   • Total Volume 04/17/2019 Random  mL Final   • Total Protein, Urine 04/17/2019 See Note  10 - 140 mg/d Final    Comment: Total Protein = 23.07 mg/dL based on random urine.  INTERPRETIVE INFORMATION: Total Protein  Total urinary protein is determined nephelometrically by adding  the albumin and Kappa and/or Lambda light chains. This value may  not agree with the total protein as determined by chemical  methods, which characteristically underestimate urinary light  chains.     • Albumin 04/17/2019 Detected  Detected Final   • Alpha-1 Globulin 04/17/2019 Detected  None Detected Final   • Alpha-2 Globulin 04/17/2019 Detected  None Detected Final   • Total Beta Globulin 04/17/2019 Detected  None Detected Final   • Gamma Globulin 04/17/2019 Detected  None Detected Final   • Free Kappa Light Chains 04/17/2019 18.70* 0.14 - 2.42 mg/dL Final   • Free Lambda Light Chains 04/17/2019 4.37* 0.02 - 0.67 mg/dL Final   • Kappa-Lambda Ratio 04/17/2019 4.28  2.04 - 10.37 ratio Final    Comment: Performed by [facility],  [address] [place] [address] [phone]  [email or web address], Joe Ann MD - Lab. Director     •  Interpretation 04/17/2019 See Note   Final    Comment: Urine is POSITIVE for monoclonal Free Lambda Light chains  (Bence Zamora Protein).     • Manual Diff Status 04/17/2019 PERFORMED   Final   • Peripheral Smear Review 04/17/2019 see below   Final    Comment: Due to instrument suspect flags, further review of peripheral smear is  indicated on this patient sample. This review may or may not result in  abnormal findings.     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  However the UPEP which did show monoclonal light chain.  Appears to have only light chain component.  Will obtain the postoperative specimen and initial biopsy specimen from [facility].   Initial BM bx did not reveal myeloma or increased plasma cells.    He is status post radiation to the left humerus.  Currently recovering well.  Follow-up SPEP and free light chain levels does not reveal any monoclonal gammopathy.  Again discussedabt the diagnosis of plasmocytoma vs myeloma and high risk of myeloma evolution in his case.   He voiced understanding that he will be complaint.  Plan to  start Zometa q 3 months. He informs that he mway have upcoming dental work , will start after getting dental clearance  RTC in ~ 3 months  mo with myeloma labs and bone survey.  Long discussion today about his diagnosis, risk of myeloma evolution, need for close surveillance through the  and he voiced understanding  He agreed and verbalized  agreement and understanding with the current plan.  I answered all  questions and concerns at this time         Please note that this dictation was created using voice recognition software. I have made every reasonable attempt to correct obvious errors, but I expect that there are errors of grammar and possibly content that I did not discover before finalizing the note.      SIGNATURES:  Ant Mansfield    CC:  Solange Alarcon D.O.  No ref. provider found

## 2019-04-23 NOTE — TELEPHONE ENCOUNTER
I contacted the patient with  [facility] ( ID#[id number])    I called the patient to see if he is able to come in to see Dr. Mansfield to review his lab results per Dr. Mansfield's request. Patient agreed.

## 2019-04-30 ENCOUNTER — PHYSICAL THERAPY (OUTPATIENT)
Dept: PHYSICAL THERAPY | Facility: REHABILITATION | Age: 44
End: 2019-04-30
Attending: ORTHOPAEDIC SURGERY
Payer: COMMERCIAL

## 2019-04-30 DIAGNOSIS — M62.81 MUSCLE WEAKNESS (GENERALIZED): ICD-10-CM

## 2019-04-30 DIAGNOSIS — M25.512 ACUTE PAIN OF LEFT SHOULDER: ICD-10-CM

## 2019-04-30 PROCEDURE — 97110 THERAPEUTIC EXERCISES: CPT

## 2019-04-30 PROCEDURE — 97161 PT EVAL LOW COMPLEX 20 MIN: CPT

## 2019-04-30 ASSESSMENT — ENCOUNTER SYMPTOMS
PAIN TIMING: IN THE EVENING
ALLEVIATING FACTORS: POSITION CHANGE
PAIN SCALE: 0
QUALITY: ACHING
PAIN SCALE AT LOWEST: 0
PAIN SCALE AT HIGHEST: 3
QUALITY: TIGHT

## 2019-05-02 ENCOUNTER — APPOINTMENT (OUTPATIENT)
Dept: PHYSICAL THERAPY | Facility: REHABILITATION | Age: 44
End: 2019-05-02
Attending: ORTHOPAEDIC SURGERY
Payer: COMMERCIAL

## 2019-05-02 ENCOUNTER — HOSPITAL ENCOUNTER (OUTPATIENT)
Dept: RADIATION ONCOLOGY | Facility: MEDICAL CENTER | Age: 44
End: 2019-05-31
Attending: RADIOLOGY
Payer: MEDICAID

## 2019-05-02 VITALS
OXYGEN SATURATION: 99 % | BODY MASS INDEX: 31.33 KG/M2 | SYSTOLIC BLOOD PRESSURE: 129 MMHG | DIASTOLIC BLOOD PRESSURE: 82 MMHG | TEMPERATURE: 98.1 F | HEART RATE: 78 BPM | WEIGHT: 224.6 LBS

## 2019-05-02 PROCEDURE — 99212 OFFICE O/P EST SF 10 MIN: CPT | Performed by: RADIOLOGY

## 2019-05-02 ASSESSMENT — PAIN SCALES - GENERAL: PAINLEVEL: NO PAIN

## 2019-05-02 NOTE — PROGRESS NOTES
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 Antonio Carbajal today in consultation at the request of Dr. Mansfield for solitary plasmacytoma.  Patient originally presented with painful left shoulder.  Patient had x-ray of the shoulder on November 10, 2018 which showed an ill-defined lytic permeated destructive lesion involving the proximal left humeral diaphysis.  Patient had a PET/CT scan on November 20, 2018 which showed a left proximal humeral cortical destructive bone lesion concerning for an osteosarcoma without evidence of multiple myeloma or metastatic disease.  Patient saw Dr. Mansfield who recommended bone biopsy which showed solitary plasmacytoma.  Patient had ORIF by Dr. Kraig Ayon on 12/28/18. Patient had bone marrow 1/9/18 which is normal.     Interval History:  Patient completed radiation with expected acute dermatitis which has since resolved. He is undergoing physical therapy next week. He has seen Dr. Mansfield who has recommended q3 month zometa with q3 month myeloma labs and bone survey.     CURRENT MEDICATIONS:  Current Outpatient Prescriptions   Medication Sig Dispense Refill   • metformin (GLUCOPHAGE) 1000 MG tablet Take 1,000 mg by mouth 2 times a day, with meals.     • GLUCOCARD EXPRESSION TEST strip CHECK SUGARS 1 TIME DAILY FOR BLOOD SUGAR CONTROL IN DIABETES; E 11 9  3   • LEVEMIR FLEXTOUCH 100 UNIT/ML Solution Pen-injector injection INJECT 15 UNITS SUBCUTANEOUSLY EVERY EVENING, INCREASE BY 2 UNITS EVERY 2ND NIGHT UNTIL YOUR FASTING BLOOD SUGAR IS LESS THAN 130  3   • enoxaparin (LOVENOX) 30 MG/0.3ML Solution inj Inject 0.3 mL as instructed every 12 hours. (Patient not taking: Reported on 4/12/2019) 56 Syringe 0   • docusate sodium 100 MG Cap Take 100 mg by mouth 2 Times a Day.  (Patient not taking: Reported on 4/12/2019) 60 Cap 0   • ferrous sulfate 325 (65 Fe) MG EC tablet Take 1 Tab by mouth 3 times a day, with meals. (Patient not taking: Reported on 4/12/2019) 90 Tab 1   • metformin ER modified (GLUMETZA) 500 MG TABLET SR 24 HR Take  by mouth 2 Times a Day.       No current facility-administered medications for this encounter.        ALLERGIES:  Patient has no known allergies.    PHYSICAL EXAM:   /82 (BP Location: Left arm, Patient Position: Sitting, BP Cuff Size: Adult)   Pulse 78   Temp 36.7 °C (98.1 °F) (Temporal)   Wt 101.9 kg (224 lb 9.6 oz)   SpO2 99%   BMI 31.33 kg/m²     Gen: well appearing  Skin: well healing    LABORATORY DATA:   Lab Results   Component Value Date/Time    SODIUM 137 04/17/2019 11:03 AM    POTASSIUM 3.8 04/17/2019 11:03 AM    CHLORIDE 103 04/17/2019 11:03 AM    CO2 25 04/17/2019 11:03 AM    GLUCOSE 122 (H) 04/17/2019 11:03 AM    BUN 11 04/17/2019 11:03 AM    CREATININE 0.87 04/17/2019 11:03 AM     Lab Results   Component Value Date/Time    ALKPHOSPHAT 53 04/17/2019 11:03 AM    ASTSGOT 32 04/17/2019 11:03 AM    ALTSGPT 46 04/17/2019 11:03 AM    TBILIRUBIN 0.7 04/17/2019 11:03 AM      Lab Results   Component Value Date/Time    WBC 5.6 04/17/2019 11:03 AM    RBC 7.03 (H) 04/17/2019 11:03 AM    HEMOGLOBIN 13.9 (L) 04/17/2019 11:03 AM    HEMATOCRIT 46.9 04/17/2019 11:03 AM    MCV 66.7 (L) 04/17/2019 11:03 AM    MCH 19.8 (L) 04/17/2019 11:03 AM    MCHC 29.6 (L) 04/17/2019 11:03 AM    MPV 9.1 01/09/2019 10:42 AM    NEUTSPOLYS 67.30 04/17/2019 11:03 AM    LYMPHOCYTES 27.40 04/17/2019 11:03 AM    MONOCYTES 2.70 04/17/2019 11:03 AM    EOSINOPHILS 2.60 04/17/2019 11:03 AM    BASOPHILS 0.00 04/17/2019 11:03 AM    HYPOCHROMIA 1+ 01/09/2019 10:42 AM    ANISOCYTOSIS 1+ 04/17/2019 11:03 AM        IMPRESSION:    43 year old with 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 Patient can return on a PRN basis.    Thank you for the opportunity to participate in his care.  If any questions or comments, please do not hesitate in calling.

## 2019-05-02 NOTE — NON-PROVIDER
Patient was seen today in clinic with Dr. Gutiérrez for follow up.  Vitals signs and weight were obtained and pain assessment was completed.  Allergies and medications were reviewed with the patient.  Review of systems completed.     Vitals/Pain:  Vitals:    05/02/19 0956   BP: 129/82   BP Location: Left arm   Patient Position: Sitting   BP Cuff Size: Adult   Pulse: 78   Temp: 36.7 °C (98.1 °F)   TempSrc: Temporal   SpO2: 99%   Weight: 101.9 kg (224 lb 9.6 oz)   Pain Score: No pain        Allergies:   Patient has no known allergies.    Current Medications:  Current Outpatient Prescriptions   Medication Sig Dispense Refill   • metformin (GLUCOPHAGE) 1000 MG tablet Take 1,000 mg by mouth 2 times a day, with meals.     • GLUCOCARD EXPRESSION TEST strip CHECK SUGARS 1 TIME DAILY FOR BLOOD SUGAR CONTROL IN DIABETES; E 11 9  3   • LEVEMIR FLEXTOUCH 100 UNIT/ML Solution Pen-injector injection INJECT 15 UNITS SUBCUTANEOUSLY EVERY EVENING, INCREASE BY 2 UNITS EVERY 2ND NIGHT UNTIL YOUR FASTING BLOOD SUGAR IS LESS THAN 130  3   • enoxaparin (LOVENOX) 30 MG/0.3ML Solution inj Inject 0.3 mL as instructed every 12 hours. (Patient not taking: Reported on 4/12/2019) 56 Syringe 0   • docusate sodium 100 MG Cap Take 100 mg by mouth 2 Times a Day. (Patient not taking: Reported on 4/12/2019) 60 Cap 0   • ferrous sulfate 325 (65 Fe) MG EC tablet Take 1 Tab by mouth 3 times a day, with meals. (Patient not taking: Reported on 4/12/2019) 90 Tab 1   • metformin ER modified (GLUMETZA) 500 MG TABLET SR 24 HR Take  by mouth 2 Times a Day.       No current facility-administered medications for this encounter.          PCP:  Dorian Wood, Med Ass't

## 2019-05-06 ENCOUNTER — APPOINTMENT (OUTPATIENT)
Dept: PHYSICAL THERAPY | Facility: REHABILITATION | Age: 44
End: 2019-05-06
Attending: ORTHOPAEDIC SURGERY
Payer: COMMERCIAL

## 2019-05-10 ENCOUNTER — APPOINTMENT (OUTPATIENT)
Dept: PHYSICAL THERAPY | Facility: REHABILITATION | Age: 44
End: 2019-05-10
Attending: ORTHOPAEDIC SURGERY
Payer: COMMERCIAL

## 2019-05-10 NOTE — OP THERAPY DAILY TREATMENT
Outpatient Physical Therapy  DAILY TREATMENT     [facility] Physical [address]  [address]  [place] [address]  Phone:  [phone]  Fax:  [phone]    Date: 05/13/2019    Patient: Abiodun Carbajal  YOB: 1975  MRN: [id number]     Time Calculation  Start time: 1030  Stop time: 1100 Time Calculation (min): 30 minutes     Chief Complaint: Shoulder Problem    Visit #: 2    SUBJECTIVE:  Was out of town last week, so hasn't been doing HEP    OBJECTIVE:        Therapeutic Exercises (CPT 98656):     1. Pulleys, x 3 min    2. Ball rolls , ff, scap, ER on table, x 20 ea    3. Rows, L1 x 20    4. Pullbacks, L1 x 20    5. SL ER , 0# x 20    Therapeutic Treatments and Modalities:     1. Manual Therapy (CPT 37225), Scar mobilization, DTW pec and UT, Inf/post GHJ glides GIII, PROM stretching all planes with focus on ER    Time-based treatments/modalities:  Manual therapy minutes (CPT 52140): 10 minutes  Therapeutic exercise minutes (CPT 55864): 20 minutes       ASSESSMENT:   Response to treatment: Poor endurance of scap stabilizers and signif compensation with UT during elevation.      PLAN/RECOMMENDATIONS:   Plan for treatment: therapy treatment to continue next visit.  Planned interventions for next visit: continue with current treatment. Scap stab, lower trap/post cuff strength.

## 2019-05-13 ENCOUNTER — PHYSICAL THERAPY (OUTPATIENT)
Dept: PHYSICAL THERAPY | Facility: REHABILITATION | Age: 44
End: 2019-05-13
Attending: ORTHOPAEDIC SURGERY
Payer: COMMERCIAL

## 2019-05-13 DIAGNOSIS — M62.81 MUSCLE WEAKNESS (GENERALIZED): ICD-10-CM

## 2019-05-13 DIAGNOSIS — M25.512 ACUTE PAIN OF LEFT SHOULDER: ICD-10-CM

## 2019-05-13 PROCEDURE — 97140 MANUAL THERAPY 1/> REGIONS: CPT

## 2019-05-13 PROCEDURE — 97110 THERAPEUTIC EXERCISES: CPT

## 2019-05-14 ENCOUNTER — APPOINTMENT (OUTPATIENT)
Dept: PHYSICAL THERAPY | Facility: REHABILITATION | Age: 44
End: 2019-05-14
Attending: ORTHOPAEDIC SURGERY
Payer: COMMERCIAL

## 2019-05-15 ENCOUNTER — APPOINTMENT (OUTPATIENT)
Dept: PHYSICAL THERAPY | Facility: REHABILITATION | Age: 44
End: 2019-05-15
Attending: ORTHOPAEDIC SURGERY
Payer: COMMERCIAL

## 2019-05-17 ENCOUNTER — APPOINTMENT (OUTPATIENT)
Dept: PHYSICAL THERAPY | Facility: REHABILITATION | Age: 44
End: 2019-05-17
Attending: ORTHOPAEDIC SURGERY
Payer: COMMERCIAL

## 2019-05-21 ENCOUNTER — APPOINTMENT (OUTPATIENT)
Dept: PHYSICAL THERAPY | Facility: REHABILITATION | Age: 44
End: 2019-05-21
Attending: ORTHOPAEDIC SURGERY
Payer: COMMERCIAL

## 2019-05-24 ENCOUNTER — APPOINTMENT (OUTPATIENT)
Dept: PHYSICAL THERAPY | Facility: REHABILITATION | Age: 44
End: 2019-05-24
Attending: ORTHOPAEDIC SURGERY
Payer: COMMERCIAL

## 2019-05-28 ENCOUNTER — PHYSICAL THERAPY (OUTPATIENT)
Dept: PHYSICAL THERAPY | Facility: REHABILITATION | Age: 44
End: 2019-05-28
Attending: ORTHOPAEDIC SURGERY
Payer: COMMERCIAL

## 2019-05-28 ENCOUNTER — TELEPHONE (OUTPATIENT)
Dept: HEMATOLOGY ONCOLOGY | Facility: MEDICAL CENTER | Age: 44
End: 2019-05-28

## 2019-05-28 DIAGNOSIS — M62.81 MUSCLE WEAKNESS (GENERALIZED): ICD-10-CM

## 2019-05-28 DIAGNOSIS — M25.512 ACUTE PAIN OF LEFT SHOULDER: ICD-10-CM

## 2019-05-28 PROCEDURE — 97140 MANUAL THERAPY 1/> REGIONS: CPT

## 2019-05-28 PROCEDURE — 97110 THERAPEUTIC EXERCISES: CPT

## 2019-05-28 NOTE — TELEPHONE ENCOUNTER
Patient contacted our office stating he was contacted by an oncology office stating they were Dr. Mansfield's office and needed to schedule an appointment. Patient is confused as he is contacting our office since we are currently Dr. Mansfield's office. He states the message did not state [facility]. Leena PARISI MA interpreted the information to him informing him Dr. Mansfield will return to [facility] and the patient should transfer his care to that office. Patient agreed and will return their call.

## 2019-05-28 NOTE — OP THERAPY DAILY TREATMENT
Outpatient Physical Therapy  DAILY TREATMENT     [facility] Physical Therapy [address].  [address]  [place] [address]  Phone:  [phone]  Fax:  [phone]    Date: 05/28/2019    Patient: Abiodun Carbajal  YOB: 1975  MRN: [id number]     Time Calculation  Start time: 1026  Stop time: 1100 Time Calculation (min): 34 minutes       Visit #: 3    SUBJECTIVE:  Patient reports that he can drive his car easier    OBJECTIVE:        Therapeutic Exercises (CPT 18994):     1. Pulleys, x 3 min      Therapeutic Exercise Summary: Pulley--hep  Supine stick ER with self prom and end range isometrics for end range ER (multiple positions of ABD)--hep  Caudal mobs gd: 3-4 in abd ER  Side lying ball roll    Therapeutic Treatments and Modalities:     1. Manual Therapy (CPT 07710)    Time-based treatments/modalities:  Manual therapy minutes (CPT 91570): 10 minutes  Therapeutic exercise minutes (CPT 35557): 20 minutes       ASSESSMENT:   Poor scapular control with limited volitional recruitment of infraspinatus  PLAN/RECOMMENDATIONS:   Progress scap stab, mobs and end range strengthening

## 2019-05-29 ENCOUNTER — APPOINTMENT (OUTPATIENT)
Dept: PHYSICAL THERAPY | Facility: REHABILITATION | Age: 44
End: 2019-05-29
Attending: ORTHOPAEDIC SURGERY
Payer: COMMERCIAL

## 2019-06-03 ENCOUNTER — PHYSICAL THERAPY (OUTPATIENT)
Dept: PHYSICAL THERAPY | Facility: REHABILITATION | Age: 44
End: 2019-06-03
Attending: ORTHOPAEDIC SURGERY
Payer: COMMERCIAL

## 2019-06-03 DIAGNOSIS — M25.512 ACUTE PAIN OF LEFT SHOULDER: ICD-10-CM

## 2019-06-03 DIAGNOSIS — M62.81 MUSCLE WEAKNESS (GENERALIZED): ICD-10-CM

## 2019-06-03 PROCEDURE — 97140 MANUAL THERAPY 1/> REGIONS: CPT

## 2019-06-03 PROCEDURE — 97014 ELECTRIC STIMULATION THERAPY: CPT

## 2019-06-03 PROCEDURE — 97110 THERAPEUTIC EXERCISES: CPT

## 2019-06-03 NOTE — OP THERAPY DAILY TREATMENT
"  Outpatient Physical Therapy  DAILY TREATMENT     [facility] Physical [address].  [address]  [place] [address]  Phone:  [phone]  Fax:  [phone]    Date: 06/03/2019    Patient: Abiodun Carbajal  YOB: 1975  MRN: [id number]     Time Calculation  Start time: 1030  Stop time: 1120 Time Calculation (min): 50 minutes     Chief Complaint: Arm Problem    Visit #: 4    SUBJECTIVE:  No new complaints.  Reports he has integrated his ER stretching into the HEP. 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     OBJECTIVE:  Current objective measures: Passive ER to side= 30 deg after manual          Therapeutic Exercises (CPT 37012):     1. UBE, x 5 min alt    2. Pulleys , x 3 min    3. Attempted star gazer position, unable to get hand behind head but could tolerate if manually assisted, x 1 min    4. SL ER, 0# x 20    5. Prone 'I' and 'T', x 15 ea    Therapeutic Treatments and Modalities:     1. Manual Therapy (CPT 48196), Scar mobilization, IASTM L shoulder/UT/LS/pec, Inf/post GHJ glides GIII, PROM stretching all planes with focus on ER, manually resisted iso holds EOR ER.     2. E Stim Unattended (CPT 24989), Croatian: infra and supra with ball roll, 5/5\" x 15 min    Time-based treatments/modalities:  Manual therapy minutes (CPT 92383): 15 minutes  Therapeutic exercise minutes (CPT 88541): 15 minutes       ASSESSMENT:   Response to treatment: Slight improvements in passive ER, but active control is very limited by strength deficits.      PLAN/RECOMMENDATIONS:   Plan for treatment: therapy treatment to continue next visit.  Planned interventions for next visit: continue with current treatment.      "

## 2019-06-05 ENCOUNTER — PHYSICAL THERAPY (OUTPATIENT)
Dept: PHYSICAL THERAPY | Facility: REHABILITATION | Age: 44
End: 2019-06-05
Attending: ORTHOPAEDIC SURGERY
Payer: COMMERCIAL

## 2019-06-05 DIAGNOSIS — M25.512 ACUTE PAIN OF LEFT SHOULDER: ICD-10-CM

## 2019-06-05 PROCEDURE — 97014 ELECTRIC STIMULATION THERAPY: CPT

## 2019-06-05 PROCEDURE — 97110 THERAPEUTIC EXERCISES: CPT

## 2019-06-05 NOTE — OP THERAPY DAILY TREATMENT
Outpatient Physical Therapy  DAILY TREATMENT     [facility] Physical Therapy [address].  [address]  [place] [address]  Phone:  [phone]  Fax:  [phone]    Date: 06/05/2019    Patient: Abiodun Carbajal  YOB: 1975  MRN: [id number]     Time Calculation  Start time: 0817  Stop time: 0905 Time Calculation (min): 48 minutes       Visit #: 5    SUBJECTIVE:  Patient reports tatt it is easier to place plates up on counter while cooking at work.    OBJECTIVE:        Therapeutic Exercises (CPT 41581):     1. Pulleys, x 5min, started focus on end range isometric holds in abduction and flexion      Therapeutic Exercise Summary: Wall ER with patient maintaining contact of his hand/elbow/shoulder while turning body away from hand--HEP  Supine stick ER with self prom(reviewed)-hep  Caudal mobs gd: 3-4 in abd ER  Cupping with p/arom for abd/er/flexion  Side lying ball roll with russian to infra/deltoid 10/10 with focus on active ER      Time-based treatments/modalities:  Therapeutic exercise minutes (CPT 32132): 25 minutes     Fle: 85 abd 75  ER 40  ASSESSMENT:   Increasing strength with flexion abduction but still limited with active ER as compared to PROM  PLAN/RECOMMENDATIONS:   Progress scap stab, mobs and end range strengthening

## 2019-06-10 ENCOUNTER — PHYSICAL THERAPY (OUTPATIENT)
Dept: PHYSICAL THERAPY | Facility: REHABILITATION | Age: 44
End: 2019-06-10
Attending: ORTHOPAEDIC SURGERY
Payer: COMMERCIAL

## 2019-06-10 DIAGNOSIS — M62.81 MUSCLE WEAKNESS (GENERALIZED): ICD-10-CM

## 2019-06-10 DIAGNOSIS — M25.512 ACUTE PAIN OF LEFT SHOULDER: ICD-10-CM

## 2019-06-10 PROCEDURE — 97014 ELECTRIC STIMULATION THERAPY: CPT

## 2019-06-10 PROCEDURE — 97110 THERAPEUTIC EXERCISES: CPT

## 2019-06-10 PROCEDURE — 97140 MANUAL THERAPY 1/> REGIONS: CPT

## 2019-06-10 NOTE — OP THERAPY DAILY TREATMENT
"  Outpatient Physical Therapy  DAILY TREATMENT     [facility] Physical [address].  [address]  [place] [address]  Phone:  [phone]  Fax:  [phone]    Date: 06/10/2019    Patient: Abiodun Carbajal  YOB: 1975  MRN: [id number]     Time Calculation  Start time: 0934  Stop time: 1020 Time Calculation (min): 46 minutes     Chief Complaint: Shoulder Problem    Visit #: 6    SUBJECTIVE:  \"Doing ok\"    OBJECTIVE:      Therapeutic Exercises (CPT 57353):     1. UBE, x 5 min alt    2. Pulleys , x 2 min    3. Dowel, standing ext, HBB pulls, Supine ER/IR.  x 20 ea    4. Row, L2 x 20    5. Supine GHJ flex to 90 deg, x 10    6. Star gazer, x 1 min, able to indep obtain position.     Therapeutic Treatments and Modalities:     1. Manual Therapy (CPT 87754), Scar mobilization, IASTM L shoulder/UT/LS/pec, Inf/post GHJ glides GIII, PROM stretching all planes with focus on ER, manually resisted iso holds EOR ER.     2. E Stim Unattended (CPT 96526), Somali: infra and supra with ball roll, 5/5\" x 15 min    Time-based treatments/modalities:  Manual therapy minutes (CPT 15453): 10 minutes  Therapeutic exercise minutes (CPT 23621): 20 minutes       ASSESSMENT:   Response to treatment: Improved ER passively allowing progression to star gazer.  Cont capsular passive restrictions and limited ability to actively recruit.      PLAN/RECOMMENDATIONS:   Plan for treatment: therapy treatment to continue next visit.  Planned interventions for next visit: continue with current treatment.      "

## 2019-06-11 NOTE — OP THERAPY DAILY TREATMENT
"  Outpatient Physical Therapy  DAILY TREATMENT     [facility] Physical [address].  [address]  [place] [address]  Phone:  [phone]  Fax:  [phone]    Date: 06/12/2019    Patient: Abiodun Carbajal  YOB: 1975  MRN: [id number]     Time Calculation  Start time: 0830  Stop time: 0917 Time Calculation (min): 47 minutes     Chief Complaint: Shoulder Problem    Visit #: 7    SUBJECTIVE:  Feels like it's getting stronger.     OBJECTIVE:      Therapeutic Exercises (CPT 39874):     1. UBE, x 5 min alt    2. Pulleys , x 2 min    3. Banded OH stretch, x 2 min    4. Ball rolls- FF on table to 90 deg with mirror feedback, x 25    5. Standing scaption, mirror, to 80 deg x 20 reps    6. TRX row, x 20    Therapeutic Treatments and Modalities:     1. Manual Therapy (CPT 82472), Scar mobilization, IASTM L shoulder/UT/LS/pec, Inf/post GHJ glides GIII, PROM stretching all planes with focus on ER, manually resisted iso holds EOR ER.     2. E Stim Unattended (CPT 16020), Hungarian: infra and supra with ball roll, 5/5\" x 15 min    Time-based treatments/modalities:  Manual therapy minutes (CPT 92633): 10 minutes  Therapeutic exercise minutes (CPT 12205): 20 minutes       ASSESSMENT:   Response to treatment: Scapular setting improves with biofeedback.  Showing good compliance with HEP, but dense scarring/capsular restrictions make progress slow.     PLAN/RECOMMENDATIONS:   Plan for treatment: therapy treatment to continue next visit.  Planned interventions for next visit: continue with current treatment.      "

## 2019-06-12 ENCOUNTER — PHYSICAL THERAPY (OUTPATIENT)
Dept: PHYSICAL THERAPY | Facility: REHABILITATION | Age: 44
End: 2019-06-12
Attending: ORTHOPAEDIC SURGERY
Payer: COMMERCIAL

## 2019-06-12 DIAGNOSIS — M25.512 ACUTE PAIN OF LEFT SHOULDER: ICD-10-CM

## 2019-06-12 DIAGNOSIS — M62.81 MUSCLE WEAKNESS (GENERALIZED): ICD-10-CM

## 2019-06-12 PROCEDURE — 97110 THERAPEUTIC EXERCISES: CPT

## 2019-06-12 PROCEDURE — 97140 MANUAL THERAPY 1/> REGIONS: CPT

## 2019-06-12 PROCEDURE — 97014 ELECTRIC STIMULATION THERAPY: CPT

## 2019-06-19 ENCOUNTER — PHYSICAL THERAPY (OUTPATIENT)
Dept: PHYSICAL THERAPY | Facility: REHABILITATION | Age: 44
End: 2019-06-19
Attending: ORTHOPAEDIC SURGERY
Payer: COMMERCIAL

## 2019-06-19 DIAGNOSIS — M62.81 MUSCLE WEAKNESS (GENERALIZED): ICD-10-CM

## 2019-06-19 DIAGNOSIS — M25.512 ACUTE PAIN OF LEFT SHOULDER: ICD-10-CM

## 2019-06-19 PROCEDURE — 97014 ELECTRIC STIMULATION THERAPY: CPT

## 2019-06-19 PROCEDURE — 97110 THERAPEUTIC EXERCISES: CPT

## 2019-06-19 NOTE — OP THERAPY DAILY TREATMENT
"  Outpatient Physical Therapy  DAILY TREATMENT     [facility] Physical [address].  [address]  [place] [address]  Phone:  [phone]  Fax:  [phone]    Date: 06/19/2019    Patient: Abiodun Carbajal  YOB: 1975  MRN: [id number]     Time Calculation  Start time: 1131  Stop time: 1219 Time Calculation (min): 48 minutes     Chief Complaint: Shoulder Problem    Visit #: 8    SUBJECTIVE:  I'm doing ok.     OBJECTIVE:      Therapeutic Exercises (CPT 96470):     1. UBE, x 5 min alt    2. Pulleys ff/IR, x 2 min/1 min    3. Supine dowel OH str, 3# wate bar, x 15    4. Prone shoulder ff/ext, x 20 ea    5. Prone shoulder\"T\", x 15    6. Counter push up, x 20    Therapeutic Treatments and Modalities:     1. Manual Therapy (CPT 85840), Scar mobilization, IASTM L shoulder/UT/LS/pec, Inf/post GHJ glides GIII, PROM stretching all planes with focus on ER, manually resisted iso holds EOR ER.     2. E Stim Unattended (CPT 38306), Vietnamese: infra and supra with ball roll, 5/5\" x 15 min    Time-based treatments/modalities:  Therapeutic exercise minutes (CPT 99683): 30 minutes       ASSESSMENT:   Response to treatment: Passively, rotation is still quite limited by adhesions, but strength within available range below shoulder height shows steady improvements.     PLAN/RECOMMENDATIONS:   Plan for treatment: therapy treatment to continue next visit.  Planned interventions for next visit: continue with current treatment.      "

## 2019-06-26 ENCOUNTER — PHYSICAL THERAPY (OUTPATIENT)
Dept: PHYSICAL THERAPY | Facility: REHABILITATION | Age: 44
End: 2019-06-26
Attending: ORTHOPAEDIC SURGERY
Payer: COMMERCIAL

## 2019-06-26 DIAGNOSIS — M62.81 MUSCLE WEAKNESS (GENERALIZED): ICD-10-CM

## 2019-06-26 DIAGNOSIS — M25.512 ACUTE PAIN OF LEFT SHOULDER: ICD-10-CM

## 2019-06-26 PROCEDURE — 97140 MANUAL THERAPY 1/> REGIONS: CPT

## 2019-06-26 PROCEDURE — 97014 ELECTRIC STIMULATION THERAPY: CPT

## 2019-06-26 PROCEDURE — 97110 THERAPEUTIC EXERCISES: CPT

## 2019-08-09 ENCOUNTER — TELEPHONE (OUTPATIENT)
Dept: PHYSICAL THERAPY | Facility: REHABILITATION | Age: 44
End: 2019-08-09

## 2019-08-09 NOTE — OP THERAPY DISCHARGE SUMMARY
Outpatient Physical Therapy  DISCHARGE SUMMARY NOTE      [address]  [address]  [place] [address]  Phone:  [phone]  Fax:  [phone]    Date of Visit: 08/09/2019    Patient: Abiodun Carbajal  YOB: 1975  MRN: [id number]     Referring Provider: No referring provider defined for this encounter.   Referring Diagnosis No admission diagnoses are documented for this encounter.     Physical Therapy Occurrence Codes    Date of onset of impairment:  6/30/18   Date physical therapy care plan established or reviewed:  4/30/19   Date physical therapy treatment started:  4/30/19          Functional Assessment Used        Your patient is being discharged from Physical Therapy with the following comments:   · Goals partially met    Comments:  Mr. Antonio Carbajal was seen for 9 PT sessions treating his L shoulder ROM and strength deficits s/p a curettage cementation and fixation of his pathologic plasmacytoma fracture. His progress was good considering the prognosis of his functional return after this type of surgery.  On his last visit, he was demonstrating functional AROM up to 80 degrees.  Continued severe weakness in external rotation as well has passive restrictions through the incision area and capsule. He was showing a plateau in progress, but doing well with HEP.  Had planned 1 more session for formal d/c, but session was canceled and not rescheduled.     Limitations Remaining:  Unable to formally assess.     Recommendations:  D/C as he has not been seen since 6/26/19.     Makayla Patino, PT, DPT    Date: 8/9/2019

## 2020-03-03 ENCOUNTER — HOSPITAL ENCOUNTER (OUTPATIENT)
Dept: RADIOLOGY | Facility: MEDICAL CENTER | Age: 45
End: 2020-03-03
Attending: INTERNAL MEDICINE
Payer: MEDICAID

## 2020-03-03 DIAGNOSIS — C90.31 SOLITARY PLASMACYTOMA IN REMISSION (HCC): ICD-10-CM

## 2020-03-03 PROCEDURE — 77075 RADEX OSSEOUS SURVEY COMPL: CPT

## 2021-04-21 ENCOUNTER — APPOINTMENT (OUTPATIENT)
Dept: LAB | Facility: MEDICAL CENTER | Age: 46
End: 2021-04-21
Payer: MEDICAID

## 2021-04-22 ENCOUNTER — HOSPITAL ENCOUNTER (OUTPATIENT)
Dept: RADIOLOGY | Facility: MEDICAL CENTER | Age: 46
End: 2021-04-22
Attending: INTERNAL MEDICINE
Payer: MEDICAID

## 2021-04-22 DIAGNOSIS — Z79.899 NEED FOR PROPHYLACTIC CHEMOTHERAPY: ICD-10-CM

## 2021-04-22 DIAGNOSIS — C90.31 SOLITARY PLASMACYTOMA IN REMISSION (HCC): ICD-10-CM

## 2021-04-22 PROCEDURE — 77075 RADEX OSSEOUS SURVEY COMPL: CPT

## 2022-01-25 NOTE — PROGRESS NOTES
.   Date of visit: 4/12/2019  2:51 PM      Chief Complaint- Solitary plasmocytoma       Identification/Prior relevant history:   11/10/2018 - XRay of Shoulder  - Ill-defined lytic permeative destructive process seen involving the proximal left proximal humeral diaphysis. Differential diagnosis includes metastatic disease, lymphoma and myeloma.  11/10/2018 - XRay of Chest  - There is linear atelectasis within the left lung base.  The heart is normal in size.  There is no evidence of pleural effusion.  Soft tissues and bony structures are unremarkable.  11/10/2018 - XRay of Humerus  - Bone mineralization is normal. There is again seen a permeative lytic destructive lesion involving the left proximal humeral diaphysis which measures 8 cm in length. There is cortical thinning. Soft tissues are normal.  11/20/2018 - PET Scan  - Left proximal humeral cortically destructive bone lesion concerning for an osteosarcoma without evidence of multiple myeloma or metastatic disease  Interim history  -He had incisional biopsy .    UPEP- Pattern consistent with mixed glomerular and tubular proteinuria  SPEP- Evaluation reveals ONE restricted band(s) migrating in the GAMMA region(s), respectively.   Estimated concentration(s): 0.5 g/dL, respectively. Also noted, ONE faint restricted band(s) migrating in the GAMMA region(s), respectively.  Interim history-  ORIF by Dr. Kraig Ayon on 12/28/18. Patient had bone marrow 1/9/18 which is normal with no evidence of increased plasma cells .  Missed few clinic appts .  Finished  28 f raction XRT   Feeling ok otherways . PT planned .  He has not started working fully.      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    left humerus s/p ORIF       Past surgical history:       Past Surgical History:   Procedure Laterality Date   • BONE MARROW BIOPSY, NDL/TROCAR Left 1/9/2019    Procedure: BONE MARROW BIOPSY, NDL/TROCAR;  Surgeon: Ambreen Whelan M.D.;  Location: ENDOSCOPY [facility];  Service: Orthopedics   • BONE MARROW ASPIRATION Left 1/9/2019    Procedure: BONE MARROW ASPIRATION    /    Shyla;  Surgeon: Ambreen Whelan M.D.;  Location: ENDOSCOPY [facility];  Service: Orthopedics   • HUMERUS NAILING INTRAMEDULLARY Left 12/28/2018    Procedure: ORIF LEFT HUMERUS;  Surgeon: Niraj Ayon M.D.;  Location: SURGERY [facility];  Service: Orthopedics   • EXCISION MASS/BIOPSY ORTHOPEDICS Left 12/12/2018    Procedure: Incisional biopsy of left humerus;  Surgeon: Niraj Ayon M.D.;  Location: SURGERY [facility];  Service: Orthopedics       Allergies:       Patient has no known allergies.    Medications:         Current Outpatient Prescriptions   Medication Sig Dispense Refill   • metformin ER modified (GLUMETZA) 500 MG TABLET SR 24 HR Take  by mouth 2 Times a Day.     • enoxaparin (LOVENOX) 30 MG/0.3ML Solution inj Inject 0.3 mL as instructed every 12 hours. (Patient not taking: Reported on 4/12/2019) 56 Syringe 0   • docusate sodium 100 MG Cap Take 100 mg by mouth 2 Times a Day. (Patient not taking: Reported on 4/12/2019) 60 Cap 0   • ferrous sulfate 325 (65 Fe) MG EC tablet Take 1 Tab by mouth 3 times a day, with meals. (Patient not taking: Reported on 4/12/2019) 90 Tab 1     No current facility-administered medications for this visit.          Social History:     Social History     Social History   • Marital status:      Spouse name: N/A   • Number of children: N/A   • Years of education: N/A     Occupational History   • Not on file.     Social History Main Topics   • Smoking status: Never Smoker   • Smokeless tobacco: Never Used   • Alcohol use No   • Drug use: No   • Sexual activity: Not on file     Other Topics Concern   • Not on file     Social History Narrative   • No narrative on file       Family History:      Family History   Problem Relation Age of Onset    "  • Cancer Father         unknown       Review of Systems:  All other review of systems are negative except what was mentioned above in the HPI.    Constitutional: Negative for fever, chills, weight loss and malaise/fatigue.    HEENT: No new auditory or visual complaints. No sore throat and neck pain.     Respiratory: Negative for cough, sputum production, shortness of breath and wheezing.    Cardiovascular: Negative for chest pain, palpitations, orthopnea and leg swelling.    Gastrointestinal: Negative for heartburn, nausea, vomiting and abdominal pain.    Genitourinary: Negative for dysuria, hematuria    Musculoskeletal: No new arthralgias or myalgias   Skin: Negative for rash and itching.    Neurological: Negative for focal weakness and headaches.    Endo/Heme/Allergies: No abnormal bleed/bruise.    Psychiatric/Behavioral: No new depression/anxiety.    Physical Exam:  Vitals: /78 (BP Location: Right arm, Patient Position: Sitting, BP Cuff Size: Adult)   Pulse 84   Temp 36.9 °C (98.4 °F) (Temporal)   Resp 20   Ht 1.803 m (5' 11\")   Wt 103.1 kg (227 lb 4.7 oz)   SpO2 98%   BMI 31.70 kg/m²     General: Not in acute distress, alert and oriented x 3  HEENT: No pallor, icterus. Oropharynx clear.   Neck: Supple, no palpable masses.  Lymph nodes: No palpable cervical, supraclavicular, axillary or inguinal lymphadenopathy.    CVS: regular rate and rhythm, no rubs or gallops  RESP: Clear to auscultate bilaterally, no wheezing or crackles.   ABD: Soft, non tender, non distended, positive bowel sounds, no palpable organomegaly  EXT: No edema or cyanosis. Ost op charges involving left upper humerus.  CNS: Alert and oriented x3, No focal deficits.  Skin- No rash      Labs:   No visits with results within 1 Week(s) from this visit.   Latest known visit with results is:   Admission on 01/09/2019, Discharged on 01/09/2019   Component Date Value Ref Range Status   • Reticulocyte Count 01/09/2019 5.4* 0.8 - 2.1 % Final "   • Retic, Absolute 01/09/2019 0.24* 0.04 - 0.06 M/uL Final   • Imm. Reticulocyte Fraction 01/09/2019 35.5* 9.3 - 17.4 % Final   • Retic Hgb Equivalent 01/09/2019 20.3* 29.0 - 35.0 pg/cell Final   • Neutrophils-Polys 01/09/2019 78.10* 44.00 - 72.00 % Final   • Lymphocytes 01/09/2019 14.60* 22.00 - 41.00 % Final   • Monocytes 01/09/2019 4.70  0.00 - 13.40 % Final   • Eosinophils 01/09/2019 1.70  0.00 - 6.90 % Final   • Basophils 01/09/2019 0.40  0.00 - 1.80 % Final   • Immature Granulocytes 01/09/2019 0.50  0.00 - 0.90 % Final   • Nucleated RBC 01/09/2019 0.40  /100 WBC Final   • Neutrophils (Absolute) 01/09/2019 6.04  1.82 - 7.42 K/uL Final    Includes immature neutrophils, if present.   • Lymphs (Absolute) 01/09/2019 1.13  1.00 - 4.80 K/uL Final   • Monos (Absolute) 01/09/2019 0.36  0.00 - 0.85 K/uL Final   • Eos (Absolute) 01/09/2019 0.13  0.00 - 0.51 K/uL Final   • Baso (Absolute) 01/09/2019 0.03  0.00 - 0.12 K/uL Final   • Immature Granulocytes (abs) 01/09/2019 0.04  0.00 - 0.11 K/uL Final   • NRBC (Absolute) 01/09/2019 0.03  K/uL Final   • WBC 01/09/2019 7.7  4.8 - 10.8 K/uL Final   • RBC 01/09/2019 4.38* 4.70 - 6.10 M/uL Final   • Hemoglobin 01/09/2019 9.3* 14.0 - 18.0 g/dL Final   • Hematocrit 01/09/2019 31.3* 42.0 - 52.0 % Final   • MCV 01/09/2019 71.5* 81.4 - 97.8 fL Final   • MCH 01/09/2019 21.2* 27.0 - 33.0 pg Final   • MCHC 01/09/2019 29.7* 33.7 - 35.3 g/dL Final   • RDW 01/09/2019 47.1  35.9 - 50.0 fL Final   • Platelet Count 01/09/2019 518* 164 - 446 K/uL Final   • MPV 01/09/2019 9.1  9.0 - 12.9 fL Final   • Hypochromia 01/09/2019 1+   Final   • Microcytosis 01/09/2019 2+   Final   • Glucose - Accu-Ck 01/09/2019 145* 65 - 99 mg/dL Final   • Peripheral Smear Review 01/09/2019 see below   Final    Comment: Due to instrument suspect flags, further review of peripheral smear is  indicated on this patient sample. This review may or may not result in  abnormal findings.     • Plt Estimation 01/09/2019  Increased   Final   • RBC Morphology 01/09/2019 Present   Final   • Polychromia 01/09/2019 2+   Final   • Poikilocytosis 01/09/2019 2+   Final   • Ovalocytes 01/09/2019 2+   Final   • Tear Drop Cells 01/09/2019 2+   Final   • Comments-Diff 01/09/2019 see below   Final    Results have been verified by peripheral blood smear review.   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  F/u on outside labs . If SPEP FLC stable, continue monitoring q 4 months .   LOng discussion abt the diagnosis of plasmocytoma vs myeloma and high risk of myeloma evolution in his case.   He voiced understanding that he will be complaint.  Plan to  start Zometa q 3 months. He informs that he mway have upcoming dental work , will start after getting dental clearance  RTC in ~ 3 months  mo with myeloma labs and bone survey.  Long discussion today about his diagnosis, risk of myeloma evolution, need for close surveillance through the  and he voiced understanding.  He agreed and verbalized  agreement and understanding with the current plan.  I answered all questions and concerns at this time         Please note that this dictation was created using voice recognition software. I have made every reasonable attempt to correct obvious errors, but I expect that there are errors of grammar and possibly content that I did not discover before finalizing the note.      SIGNATURES:  Ant Mansfield    CC:  Solange Alarcon D.O.  No ref. provider found

## 2022-04-27 ENCOUNTER — HOSPITAL ENCOUNTER (OUTPATIENT)
Dept: RADIOLOGY | Facility: MEDICAL CENTER | Age: 47
End: 2022-04-27
Attending: INTERNAL MEDICINE

## 2022-04-27 DIAGNOSIS — C90.31 SOLITARY PLASMACYTOMA IN REMISSION (HCC): ICD-10-CM

## 2022-04-27 PROCEDURE — 77075 RADEX OSSEOUS SURVEY COMPL: CPT

## 2023-05-11 ENCOUNTER — HOSPITAL ENCOUNTER (OUTPATIENT)
Dept: RADIOLOGY | Facility: MEDICAL CENTER | Age: 48
End: 2023-05-11
Attending: INTERNAL MEDICINE

## 2023-05-11 DIAGNOSIS — C90.31 SOLITARY PLASMACYTOMA IN REMISSION (HCC): ICD-10-CM

## 2023-05-11 PROCEDURE — 77075 RADEX OSSEOUS SURVEY COMPL: CPT

## 2024-05-29 ENCOUNTER — HOSPITAL ENCOUNTER (OUTPATIENT)
Dept: RADIOLOGY | Facility: MEDICAL CENTER | Age: 49
End: 2024-05-29
Attending: INTERNAL MEDICINE

## 2024-05-29 DIAGNOSIS — C90.31 SOLITARY PLASMACYTOMA IN REMISSION (HCC): ICD-10-CM

## 2024-05-29 DIAGNOSIS — Z79.899 OTHER LONG TERM (CURRENT) DRUG THERAPY: ICD-10-CM

## 2024-05-29 DIAGNOSIS — D50.9 IRON DEFICIENCY ANEMIA, UNSPECIFIED IRON DEFICIENCY ANEMIA TYPE: ICD-10-CM

## (undated) DEVICE — CON SEDATION EA ADDL 15 MIN

## (undated) DEVICE — CON SEDATION/>5 YR 1ST 15 MIN

## (undated) DEVICE — CANNULA W/ SUPPLY TUBING O2 - (50/CA)

## (undated) DEVICE — SYRINGE 3 CC 22 GA X 1-1/2 - NDL SAFETY (50/BX 8BX/CA)

## (undated) DEVICE — SYRINGE 6 CC 20 GA X 1 1/2 - NDL SAFETY  (50/BX)

## (undated) DEVICE — ELECTRODE 850 FOAM ADHESIVE - HYDROGEL RADIOTRNSPRNT (50/PK)

## (undated) DEVICE — SOD. CHL 10CC SYRINGE PREFILL - W/10 CC (30/BX)